# Patient Record
Sex: MALE | Race: WHITE | Employment: FULL TIME | URBAN - METROPOLITAN AREA
[De-identification: names, ages, dates, MRNs, and addresses within clinical notes are randomized per-mention and may not be internally consistent; named-entity substitution may affect disease eponyms.]

---

## 2017-05-18 ENCOUNTER — OFFICE VISIT (OUTPATIENT)
Dept: INTERNAL MEDICINE CLINIC | Age: 47
End: 2017-05-18

## 2017-05-18 VITALS
OXYGEN SATURATION: 98 % | BODY MASS INDEX: 32.78 KG/M2 | WEIGHT: 229 LBS | RESPIRATION RATE: 16 BRPM | HEART RATE: 65 BPM | TEMPERATURE: 97.6 F | SYSTOLIC BLOOD PRESSURE: 140 MMHG | DIASTOLIC BLOOD PRESSURE: 100 MMHG | HEIGHT: 70 IN

## 2017-05-18 DIAGNOSIS — I10 ESSENTIAL HYPERTENSION, BENIGN: Primary | ICD-10-CM

## 2017-05-18 RX ORDER — AMLODIPINE BESYLATE 10 MG/1
TABLET ORAL
Qty: 90 TAB | Refills: 3 | Status: SHIPPED | OUTPATIENT
Start: 2017-05-18 | End: 2017-11-14 | Stop reason: SDUPTHER

## 2017-05-18 RX ORDER — LISINOPRIL AND HYDROCHLOROTHIAZIDE 12.5; 2 MG/1; MG/1
TABLET ORAL
Qty: 90 TAB | Refills: 1 | Status: SHIPPED | OUTPATIENT
Start: 2017-05-18 | End: 2017-12-01

## 2017-05-18 RX ORDER — AMLODIPINE BESYLATE 10 MG/1
TABLET ORAL DAILY
COMMUNITY
End: 2017-05-18 | Stop reason: SDUPTHER

## 2017-05-18 RX ORDER — LISINOPRIL 20 MG/1
TABLET ORAL DAILY
COMMUNITY
End: 2017-05-18

## 2017-05-18 NOTE — PATIENT INSTRUCTIONS

## 2017-05-18 NOTE — PROGRESS NOTES
HPI:   NP evaluation  Relocated from [place]  Hx notable for HTN (BP labile on self measurement)  Recent retinal hemorrhage (R); under care of opthal  w/o chest pain/abd. discomfort; no dyspnea, cough or pedal edema; denies constitutional complaints of fever, night sweats or wt loss; no evidence of GI/ hemorrhage; no polyuria/polydipsia. Activity is age appropriate. ROS is otherwise negative. Past Medical History:   Diagnosis Date    Essential hypertension, benign     Retinal hemorrhage of right eye 04/2017       No past surgical history on file. Social History     Social History    Marital status: SINGLE     Spouse name: N/A    Number of children: N/A    Years of education: N/A     Occupational History    Not on file. Social History Main Topics    Smoking status: Never Smoker    Smokeless tobacco: Not on file    Alcohol use Yes      Comment: occ    Drug use: No    Sexual activity: Not on file     Other Topics Concern    Not on file     Social History Narrative    No narrative on file       No Known Allergies    Family History   Problem Relation Age of Onset    Hypertension Mother     Hypertension Father        Meds: norvasc 10 mg every day; lisinopril 20 mg qd        Visit Vitals    BP (!) 140/100 (BP 1 Location: Left arm, BP Patient Position: Sitting)    Pulse 65    Temp 97.6 °F (36.4 °C) (Tympanic)    Resp 16    Ht 5' 10\" (1.778 m)    Wt 229 lb (103.9 kg)    SpO2 98%    BMI 32.86 kg/m2       PE  Well nourished in NAD  HEENT:   OP: clear. Neck: supple w/o mass or bruits. Chest: clear. CV: RRR w/o m,r,g; pulses intact. Abd: soft, NT, w/o HSM or mass. Ext: w/o edema. Neuro: NF. Assessment and Plan    Encounter Diagnoses   Name Primary?  Essential hypertension, benign Yes   BP mildly elevated and labile at home  Change lisinopril to lisinopril hct 20/12.5 qd  I have reviewed/discussed the above normal BMI with the patient.   I have recommended the following interventions: dietary management education, guidance, and counseling .  .    OV 6 mos or prn (labs at that time - apparently nl 11/2016)  I have explained plan to patient and the patient verbalizes understanding

## 2017-08-30 ENCOUNTER — OFFICE VISIT (OUTPATIENT)
Dept: INTERNAL MEDICINE CLINIC | Age: 47
End: 2017-08-30

## 2017-08-30 VITALS
RESPIRATION RATE: 16 BRPM | OXYGEN SATURATION: 99 % | HEIGHT: 70 IN | WEIGHT: 235 LBS | TEMPERATURE: 97.2 F | BODY MASS INDEX: 33.64 KG/M2 | DIASTOLIC BLOOD PRESSURE: 80 MMHG | HEART RATE: 72 BPM | SYSTOLIC BLOOD PRESSURE: 138 MMHG

## 2017-08-30 DIAGNOSIS — M25.551 PAIN OF RIGHT HIP JOINT: Primary | ICD-10-CM

## 2017-08-30 DIAGNOSIS — Z23 ENCOUNTER FOR IMMUNIZATION: ICD-10-CM

## 2017-08-30 DIAGNOSIS — I10 ESSENTIAL HYPERTENSION, BENIGN: ICD-10-CM

## 2017-08-30 NOTE — PROGRESS NOTES
HPI:   Intermittent (R) posterior hip pain x 3-4 weeks; aggravated by his wallet  No injury or rash  Seen at [facility] yesterday post work injury of (L) third finger (given keflex/indocin/tramadol); has f/u arranged through work  Hx notable for HTN    ROS is otherwise negative. Past Medical History:   Diagnosis Date    Essential hypertension, benign     Retinal hemorrhage of right eye 04/2017       History reviewed. No pertinent surgical history. Social History     Social History    Marital status: SINGLE     Spouse name: N/A    Number of children: N/A    Years of education: N/A     Occupational History    Not on file. Social History Main Topics    Smoking status: Never Smoker    Smokeless tobacco: Never Used    Alcohol use Yes      Comment: occ    Drug use: No    Sexual activity: Not on file     Other Topics Concern    Not on file     Social History Narrative       No Known Allergies    Family History   Problem Relation Age of Onset    Hypertension Mother     Hypertension Father        Current Outpatient Prescriptions   Medication Sig Dispense Refill    amLODIPine (NORVASC) 10 mg tablet 1 qd 90 Tab 3    lisinopril-hydroCHLOROthiazide (PRINZIDE, ZESTORETIC) 20-12.5 mg per tablet 1 qam 90 Tab 1           Visit Vitals    /80    Pulse 72    Temp 97.2 °F (36.2 °C) (Tympanic)    Resp 16    Ht 5' 10\" (1.778 m)    Wt 235 lb (106.6 kg)    SpO2 99%    BMI 33.72 kg/m2       PE  Well nourished in NAD  HEENT: OP: clear. Neck: supple w/o mass or bruits. Chest: clear. CV: RRR w/o m,r,g; pulses intact. Abd: soft, NT, w/o HSM or mass. Ext: w/o edema. (R) hip: FROM with mild pain; mild posterior tenderness w/o rash  Neuro: NF. Assessment and Plan    Encounter Diagnoses   Name Primary?     Pain of right hip joint Yes    Essential hypertension, benign     Encounter for immunization        MSK hip pain: aggravated by wallet; attempt to carry it elsewhere; already given indocin by [facility]; to ortho if worsens  HTN - controlled  Flu vaccine given  I have reviewed/discussed the above normal BMI with the patient. I have recommended the following interventions: dietary management education, guidance, and counseling .  .    OV 4-6 mos or prn  I have explained plan to patient and the patient verbalizes understanding

## 2017-08-30 NOTE — PROGRESS NOTES
1. Have you been to the ER, urgent care clinic since your last visit? Hospitalized since your last visit? Yes When: aug 29 Where:  medical clinic Reason for visit: finger injury     2. Have you seen or consulted any other health care providers outside of the [address] [place] [address] since your last visit? Include any pap smears or colon screening.  No

## 2017-08-30 NOTE — PATIENT INSTRUCTIONS

## 2017-11-14 ENCOUNTER — OFFICE VISIT (OUTPATIENT)
Dept: INTERNAL MEDICINE CLINIC | Age: 47
End: 2017-11-14

## 2017-11-14 VITALS
TEMPERATURE: 97.6 F | SYSTOLIC BLOOD PRESSURE: 142 MMHG | RESPIRATION RATE: 16 BRPM | HEART RATE: 89 BPM | OXYGEN SATURATION: 98 % | BODY MASS INDEX: 34.79 KG/M2 | DIASTOLIC BLOOD PRESSURE: 92 MMHG | WEIGHT: 243 LBS | HEIGHT: 70 IN

## 2017-11-14 DIAGNOSIS — I10 ESSENTIAL HYPERTENSION, BENIGN: Primary | ICD-10-CM

## 2017-11-14 RX ORDER — AMLODIPINE BESYLATE 10 MG/1
TABLET ORAL
Qty: 90 TAB | Refills: 3 | Status: SHIPPED | OUTPATIENT
Start: 2017-11-14 | End: 2018-12-05 | Stop reason: SDUPTHER

## 2017-11-14 RX ORDER — LISINOPRIL AND HYDROCHLOROTHIAZIDE 20; 25 MG/1; MG/1
1 TABLET ORAL DAILY
Qty: 90 TAB | Refills: 3 | Status: SHIPPED | OUTPATIENT
Start: 2017-11-14 | End: 2018-12-05

## 2017-11-14 NOTE — PROGRESS NOTES
HPI:   Routine f/u of HTN  w/o chest pain/abd. discomfort; no dyspnea, cough or pedal edema; denies constitutional complaints of fever, night sweats or wt loss; no evidence of GI/ hemorrhage; no polyuria/polydipsia. Activity is age appropriate. ROS is otherwise negative. Past Medical History:   Diagnosis Date    Essential hypertension, benign     Retinal hemorrhage of right eye 04/2017       History reviewed. No pertinent surgical history. Social History     Social History    Marital status: SINGLE     Spouse name: N/A    Number of children: N/A    Years of education: N/A     Occupational History    Not on file. Social History Main Topics    Smoking status: Never Smoker    Smokeless tobacco: Never Used    Alcohol use Yes      Comment: occ    Drug use: No    Sexual activity: Not on file     Other Topics Concern    Not on file     Social History Narrative       No Known Allergies    Family History   Problem Relation Age of Onset    Hypertension Mother     Hypertension Father        Current Outpatient Prescriptions   Medication Sig Dispense Refill    amLODIPine (NORVASC) 10 mg tablet 1 qd 90 Tab 3    lisinopril-hydroCHLOROthiazide (PRINZIDE, ZESTORETIC) 20-12.5 mg per tablet 1 qam 90 Tab 1           Visit Vitals    BP (!) 142/92    Pulse 89    Temp 97.6 °F (36.4 °C)    Resp 16    Ht 5' 10\" (1.778 m)    Wt 243 lb (110.2 kg)    SpO2 98%    BMI 34.87 kg/m2       PE  Well nourished in NAD  HEENT:   OP: clear. Neck: supple w/o mass or bruits. Chest: clear. CV: RRR w/o m,r,g; pulses intact. Abd: soft, NT, w/o HSM or mass. Ext: w/o edema. Neuro: NF. Assessment and Plan    Encounter Diagnoses   Name Primary?  Essential hypertension, benign Yes   HTN - mildly elevated - increase lisinopril hct to 20/25 qd  Labs soon to assess metabolic parameters  I have reviewed/discussed the above normal BMI with the patient.   I have recommended the following interventions: dietary management education, guidance, and counseling . Kayy Becerril     Otherwise, no change in rx  OV 6 mos or prn  I have explained plan to patient and the patient verbalizes understanding

## 2017-11-14 NOTE — PATIENT INSTRUCTIONS

## 2017-11-14 NOTE — PROGRESS NOTES
1. Have you been to the ER, urgent care clinic since your last visit? Hospitalized since your last visit? No    2. Have you seen or consulted any other health care providers outside of the [address] [place] [address] since your last visit? Include any pap smears or colon screening.  Yes When: oct 30 [facility]: [facility] Reason for visit: eye care

## 2017-11-30 ENCOUNTER — LAB ONLY (OUTPATIENT)
Dept: INTERNAL MEDICINE CLINIC | Age: 47
End: 2017-11-30

## 2017-11-30 ENCOUNTER — HOSPITAL ENCOUNTER (OUTPATIENT)
Dept: LAB | Age: 47
Discharge: HOME | End: 2017-11-30
Payer: OTHER GOVERNMENT

## 2017-11-30 DIAGNOSIS — Z23 ENCOUNTER FOR IMMUNIZATION: ICD-10-CM

## 2017-11-30 DIAGNOSIS — I10 ESSENTIAL HYPERTENSION, BENIGN: Primary | ICD-10-CM

## 2017-11-30 DIAGNOSIS — I10 ESSENTIAL HYPERTENSION, BENIGN: ICD-10-CM

## 2017-11-30 LAB
ALBUMIN SERPL-MCNC: 4.1 G/DL (ref 3.4–5)
ALBUMIN/GLOB SERPL: 1.2 {RATIO} (ref 0.8–1.7)
ALP SERPL-CCNC: 92 U/L (ref 45–117)
ALT SERPL-CCNC: 33 U/L (ref 16–61)
ANION GAP SERPL CALC-SCNC: 7 MMOL/L (ref 3–18)
AST SERPL-CCNC: 13 U/L (ref 15–37)
BASOPHILS # BLD: 0.1 K/UL (ref 0–0.06)
BASOPHILS NFR BLD: 2 % (ref 0–2)
BILIRUB SERPL-MCNC: 0.5 MG/DL (ref 0.2–1)
BUN SERPL-MCNC: 15 MG/DL (ref 7–18)
BUN/CREAT SERPL: 15 (ref 12–20)
CALCIUM SERPL-MCNC: 9.4 MG/DL (ref 8.5–10.1)
CHLORIDE SERPL-SCNC: 105 MMOL/L (ref 100–108)
CHOLEST SERPL-MCNC: 257 MG/DL
CO2 SERPL-SCNC: 28 MMOL/L (ref 21–32)
CREAT SERPL-MCNC: 1.02 MG/DL (ref 0.6–1.3)
DIFFERENTIAL METHOD BLD: ABNORMAL
EOSINOPHIL # BLD: 0.6 K/UL (ref 0–0.4)
EOSINOPHIL NFR BLD: 10 % (ref 0–5)
ERYTHROCYTE [DISTWIDTH] IN BLOOD BY AUTOMATED COUNT: 13 % (ref 11.6–14.5)
GLOBULIN SER CALC-MCNC: 3.5 G/DL (ref 2–4)
GLUCOSE SERPL-MCNC: 162 MG/DL (ref 74–99)
HCT VFR BLD AUTO: 42.4 % (ref 36–48)
HDLC SERPL-MCNC: 45 MG/DL (ref 40–60)
HDLC SERPL: 5.7 {RATIO} (ref 0–5)
HGB BLD-MCNC: 14.3 G/DL (ref 13–16)
LDLC SERPL CALC-MCNC: 182.4 MG/DL (ref 0–100)
LIPID PROFILE,FLP: ABNORMAL
LYMPHOCYTES # BLD: 1.3 K/UL (ref 0.9–3.6)
LYMPHOCYTES NFR BLD: 22 % (ref 21–52)
MCH RBC QN AUTO: 29.1 PG (ref 24–34)
MCHC RBC AUTO-ENTMCNC: 33.7 G/DL (ref 31–37)
MCV RBC AUTO: 86.4 FL (ref 74–97)
MONOCYTES # BLD: 0.5 K/UL (ref 0.05–1.2)
MONOCYTES NFR BLD: 9 % (ref 3–10)
NEUTS SEG # BLD: 3.4 K/UL (ref 1.8–8)
NEUTS SEG NFR BLD: 57 % (ref 40–73)
PLATELET # BLD AUTO: 276 K/UL (ref 135–420)
PMV BLD AUTO: 12.3 FL (ref 9.2–11.8)
POTASSIUM SERPL-SCNC: 4.6 MMOL/L (ref 3.5–5.5)
PROT SERPL-MCNC: 7.6 G/DL (ref 6.4–8.2)
RBC # BLD AUTO: 4.91 M/UL (ref 4.7–5.5)
SODIUM SERPL-SCNC: 140 MMOL/L (ref 136–145)
TRIGL SERPL-MCNC: 148 MG/DL (ref ?–150)
VLDLC SERPL CALC-MCNC: 29.6 MG/DL
WBC # BLD AUTO: 5.9 K/UL (ref 4.6–13.2)

## 2017-11-30 PROCEDURE — 80053 COMPREHEN METABOLIC PANEL: CPT | Performed by: INTERNAL MEDICINE

## 2017-11-30 PROCEDURE — 85025 COMPLETE CBC W/AUTO DIFF WBC: CPT | Performed by: INTERNAL MEDICINE

## 2017-11-30 PROCEDURE — 80061 LIPID PANEL: CPT | Performed by: INTERNAL MEDICINE

## 2017-12-01 NOTE — PROGRESS NOTES
Repeat labs at [facility] 4 mos; urged diet in light of increased BS/chol

## 2018-04-19 ENCOUNTER — OFFICE VISIT (OUTPATIENT)
Dept: INTERNAL MEDICINE CLINIC | Age: 48
End: 2018-04-19

## 2018-04-19 VITALS
DIASTOLIC BLOOD PRESSURE: 80 MMHG | HEART RATE: 83 BPM | BODY MASS INDEX: 33.64 KG/M2 | SYSTOLIC BLOOD PRESSURE: 138 MMHG | WEIGHT: 235 LBS | RESPIRATION RATE: 16 BRPM | HEIGHT: 70 IN | OXYGEN SATURATION: 98 % | TEMPERATURE: 97.2 F

## 2018-04-19 DIAGNOSIS — I10 ESSENTIAL HYPERTENSION, BENIGN: ICD-10-CM

## 2018-04-19 DIAGNOSIS — J06.9 ACUTE URI: Primary | ICD-10-CM

## 2018-04-19 DIAGNOSIS — R73.09 ABNORMAL GLUCOSE: ICD-10-CM

## 2018-04-19 DIAGNOSIS — E78.2 MIXED HYPERLIPIDEMIA: ICD-10-CM

## 2018-04-19 NOTE — PROGRESS NOTES
1. Have you been to the ER, urgent care clinic since your last visit? Hospitalized since your last visit? No    2. Have you seen or consulted any other health care providers outside of the [facility] since your last visit? Include any pap smears or colon screening.  No

## 2018-04-19 NOTE — PROGRESS NOTES
HPI:  2 days of head congestion, facial discomfort, NP cough w/o fever, dyspnea, CP, or N  Hx notable for HTN  Recent labs showed elevated glc/chol    ROS is otherwise negative. Past Medical History:   Diagnosis Date    Essential hypertension, benign     Retinal hemorrhage of right eye 04/2017       No past surgical history on file. Social History     Social History    Marital status: SINGLE     Spouse name: N/A    Number of children: N/A    Years of education: N/A     Occupational History    Not on file. Social History Main Topics    Smoking status: Never Smoker    Smokeless tobacco: Never Used    Alcohol use Yes      Comment: occ    Drug use: No    Sexual activity: Not on file     Other Topics Concern    Not on file     Social History Narrative       No Known Allergies    Family History   Problem Relation Age of Onset    Hypertension Mother     Hypertension Father        Current Outpatient Prescriptions   Medication Sig Dispense Refill    amLODIPine (NORVASC) 10 mg tablet 1 qd 90 Tab 3    lisinopril-hydroCHLOROthiazide (PRINZIDE, ZESTORETIC) 20-25 mg per tablet Take 1 Tab by mouth daily. 90 Tab 3           Visit Vitals    /80 (BP 1 Location: Left arm, BP Patient Position: Sitting)    Pulse 83    Temp 97.2 °F (36.2 °C) (Tympanic)    Resp 16    Ht 5' 10\" (1.778 m)    Wt 235 lb (106.6 kg)    SpO2 98%    BMI 33.72 kg/m2       PE  Well nourished in NAD  HEENT:   OP: clear. TMS: clear  Neck: supple w/o mass or bruits. Chest: clear. CV: RRR w/o m,r,g; pulses intact. Abd: soft, NT, w/o HSM or mass. Ext: w/o edema. Neuro: NF. Assessment and Plan    Encounter Diagnoses   Name Primary?     Acute URI Yes    Essential hypertension, benign     Mixed hyperlipidemia     Abnormal glucose      Probable viral URI - zyrtec prn  F/U worsening or unimproved 7 days  HTN - controlled  Hyperlipidemia/elevated glucose - labs soon to assess  OV 4 mos or prn  I have explained plan to patient and the patient verbalizes understanding          Discussed the patient's BMI with him. The BMI follow up plan is as follows:     dietary management education, guidance, and counseling  encourage exercise  monitor weight  prescribed dietary intake    An After Visit Summary was printed and given to the patient.

## 2018-04-19 NOTE — LETTER
4/19/2018 10:57 AM 
 
Mr. Chani Hernandez [phone] Mr. Ruby was seen today for medical exam. 
 
 
 
 
Sincerely, 
 
 
Maxine Torres MD

## 2018-04-19 NOTE — PATIENT INSTRUCTIONS
High Cholesterol: Care Instructions  Your Care Instructions    Cholesterol is a type of fat in your blood. It is needed for many body functions, such as making new cells. Cholesterol is made by your body. It also comes from food you eat. High cholesterol means that you have too much of the fat in your blood. This raises your risk of a heart attack and stroke. LDL and HDL are part of your total cholesterol. LDL is the \"bad\" cholesterol. High LDL can raise your risk for heart disease, heart attack, and stroke. HDL is the \"good\" cholesterol. It helps clear bad cholesterol from the body. High HDL is linked with a lower risk of heart disease, heart attack, and stroke. Your cholesterol levels help your doctor find out your risk for having a heart attack or stroke. You and your doctor can talk about whether you need to lower your risk and what treatment is best for you. A heart-healthy lifestyle along with medicines can help lower your cholesterol and your risk. The way you choose to lower your risk will depend on how high your risk is for heart attack and stroke. It will also depend on how you feel about taking medicines. Follow-up care is a key part of your treatment and safety. Be sure to make and go to all appointments, and call your doctor if you are having problems. It's also a good idea to know your test results and keep a list of the medicines you take. How can you care for yourself at home? · Eat a variety of foods every day. Good choices include fruits, vegetables, whole grains (like oatmeal), dried beans and peas, nuts and seeds, soy products (like tofu), and fat-free or low-fat dairy products. · Replace butter, margarine, and hydrogenated or partially hydrogenated oils with olive and canola oils. (Canola oil margarine without trans fat is fine.)  · Replace red meat with fish, poultry, and soy protein (like tofu). · Limit processed and packaged foods like chips, crackers, and cookies.   · Bake, broil, or steam foods. Don't dill them. · Be physically active. Get at least 30 minutes of exercise on most days of the week. Walking is a good choice. You also may want to do other activities, such as running, swimming, cycling, or playing tennis or team sports. · Stay at a healthy weight or lose weight by making the changes in eating and physical activity listed above. Losing just a small amount of weight, even 5 to 10 pounds, can reduce your risk for having a heart attack or stroke. · Do not smoke. When should you call for help? Watch closely for changes in your health, and be sure to contact your doctor if:  ? · You need help making lifestyle changes. ? · You have questions about your medicine. Where can you learn more? Go to http://bettina-gale.info/. Enter Z484 in the search box to learn more about \"High Cholesterol: Care Instructions. \"  Current as of: September 21, 2016  Content Version: 11.4  © 6756-7431 [employer]. Care instructions adapted under license by [employer] (which disclaims liability or warranty for this information). If you have questions about a medical condition or this instruction, always ask your healthcare professional. [address] any warranty or liability for your use of this information. Body Mass Index: Care Instructions  Your Care Instructions    Body mass index (BMI) can help you see if your weight is raising your risk for health problems. It uses a formula to compare how much you weigh with how tall you are. · A BMI lower than 18.5 is considered underweight. · A BMI between 18.5 and 24.9 is considered healthy. · A BMI between 25 and 29.9 is considered overweight. A BMI of 30 or higher is considered obese. If your BMI is in the normal range, it means that you have a lower risk for weight-related health problems.  If your BMI is in the overweight or obese range, you may be at increased risk for weight-related health problems, such as high blood pressure, heart disease, stroke, arthritis or joint pain, and diabetes. If your BMI is in the underweight range, you may be at increased risk for health problems such as fatigue, lower protection (immunity) against illness, muscle loss, bone loss, hair loss, and hormone problems. BMI is just one measure of your risk for weight-related health problems. You may be at higher risk for health problems if you are not active, you eat an unhealthy diet, or you drink too much alcohol or use tobacco products. Follow-up care is a key part of your treatment and safety. Be sure to make and go to all appointments, and call your doctor if you are having problems. It's also a good idea to know your test results and keep a list of the medicines you take. How can you care for yourself at home? · Practice healthy eating habits. This includes eating plenty of fruits, vegetables, whole grains, lean protein, and low-fat dairy. · If your doctor recommends it, get more exercise. Walking is a good choice. Bit by bit, increase the amount you walk every day. Try for at least 30 minutes on most days of the week. · Do not smoke. Smoking can increase your risk for health problems. If you need help quitting, talk to your doctor about stop-smoking programs and medicines. These can increase your chances of quitting for good. · Limit alcohol to 2 drinks a day for men and 1 drink a day for women. Too much alcohol can cause health problems. If you have a BMI higher than 25  · Your doctor may do other tests to check your risk for weight-related health problems. This may include measuring the distance around your waist. A waist measurement of more than 40 inches in men or 35 inches in women can increase the risk of weight-related health problems. · Talk with your doctor about steps you can take to stay healthy or improve your health.  You may need to make lifestyle changes to lose weight and stay healthy, such as changing your diet and getting regular exercise. If you have a BMI lower than 18.5  · Your doctor may do other tests to check your risk for health problems. · Talk with your doctor about steps you can take to stay healthy or improve your health. You may need to make lifestyle changes to gain or maintain weight and stay healthy, such as getting more healthy foods in your diet and doing exercises to build muscle. Where can you learn more? Go to http://bettina-gale.info/. Enter S176 in the search box to learn more about \"Body Mass Index: Care Instructions. \"  Current as of: October 13, 2016  Content Version: 11.4  © 5377-9594 [employer]. Care instructions adapted under license by [employer] (which disclaims liability or warranty for this information). If you have questions about a medical condition or this instruction, always ask your healthcare professional. [address] any warranty or liability for your use of this information.

## 2018-11-29 DIAGNOSIS — I10 ESSENTIAL HYPERTENSION, BENIGN: Primary | ICD-10-CM

## 2018-11-29 DIAGNOSIS — R73.09 ABNORMAL GLUCOSE: ICD-10-CM

## 2018-12-05 ENCOUNTER — OFFICE VISIT (OUTPATIENT)
Dept: INTERNAL MEDICINE CLINIC | Age: 48
End: 2018-12-05

## 2018-12-05 VITALS
DIASTOLIC BLOOD PRESSURE: 90 MMHG | HEIGHT: 70 IN | WEIGHT: 240 LBS | OXYGEN SATURATION: 98 % | BODY MASS INDEX: 34.36 KG/M2 | RESPIRATION RATE: 16 BRPM | SYSTOLIC BLOOD PRESSURE: 150 MMHG | HEART RATE: 63 BPM | TEMPERATURE: 97.1 F

## 2018-12-05 DIAGNOSIS — R73.02 IGT (IMPAIRED GLUCOSE TOLERANCE): ICD-10-CM

## 2018-12-05 DIAGNOSIS — I10 ESSENTIAL HYPERTENSION, BENIGN: ICD-10-CM

## 2018-12-05 DIAGNOSIS — Z00.00 ROUTINE GENERAL MEDICAL EXAMINATION AT A HEALTH CARE FACILITY: Primary | ICD-10-CM

## 2018-12-05 DIAGNOSIS — E78.2 MIXED HYPERLIPIDEMIA: ICD-10-CM

## 2018-12-05 DIAGNOSIS — Z12.11 SCREEN FOR COLON CANCER: ICD-10-CM

## 2018-12-05 DIAGNOSIS — Z23 ENCOUNTER FOR IMMUNIZATION: ICD-10-CM

## 2018-12-05 RX ORDER — AMLODIPINE BESYLATE 10 MG/1
TABLET ORAL
Qty: 90 TAB | Refills: 3 | Status: SHIPPED | OUTPATIENT
Start: 2018-12-05 | End: 2020-01-09 | Stop reason: SDUPTHER

## 2018-12-05 RX ORDER — LISINOPRIL 40 MG/1
40 TABLET ORAL DAILY
Qty: 90 TAB | Refills: 3 | Status: SHIPPED | OUTPATIENT
Start: 2018-12-05 | End: 2020-01-09 | Stop reason: SDUPTHER

## 2018-12-05 RX ORDER — HYDROCHLOROTHIAZIDE 25 MG/1
25 TABLET ORAL DAILY
Qty: 90 TAB | Refills: 3 | Status: SHIPPED | OUTPATIENT
Start: 2018-12-05 | End: 2020-01-09 | Stop reason: SDUPTHER

## 2018-12-05 NOTE — PATIENT INSTRUCTIONS
Body Mass Index: Care Instructions Your Care Instructions Body mass index (BMI) can help you see if your weight is raising your risk for health problems. It uses a formula to compare how much you weigh with how tall you are. · A BMI lower than 18.5 is considered underweight. · A BMI between 18.5 and 24.9 is considered healthy. · A BMI between 25 and 29.9 is considered overweight. A BMI of 30 or higher is considered obese. If your BMI is in the normal range, it means that you have a lower risk for weight-related health problems. If your BMI is in the overweight or obese range, you may be at increased risk for weight-related health problems, such as high blood pressure, heart disease, stroke, arthritis or joint pain, and diabetes. If your BMI is in the underweight range, you may be at increased risk for health problems such as fatigue, lower protection (immunity) against illness, muscle loss, bone loss, hair loss, and hormone problems. BMI is just one measure of your risk for weight-related health problems. You may be at higher risk for health problems if you are not active, you eat an unhealthy diet, or you drink too much alcohol or use tobacco products. Follow-up care is a key part of your treatment and safety. Be sure to make and go to all appointments, and call your doctor if you are having problems. It's also a good idea to know your test results and keep a list of the medicines you take. How can you care for yourself at home? · Practice healthy eating habits. This includes eating plenty of fruits, vegetables, whole grains, lean protein, and low-fat dairy. · If your doctor recommends it, get more exercise. Walking is a good choice. Bit by bit, increase the amount you walk every day. Try for at least 30 minutes on most days of the week. · Do not smoke. Smoking can increase your risk for health problems.  If you need help quitting, talk to your doctor about stop-smoking programs and medicines. These can increase your chances of quitting for good. · Limit alcohol to 2 drinks a day for men and 1 drink a day for women. Too much alcohol can cause health problems. If you have a BMI higher than 25 · Your doctor may do other tests to check your risk for weight-related health problems. This may include measuring the distance around your waist. A waist measurement of more than 40 inches in men or 35 inches in women can increase the risk of weight-related health problems. · Talk with your doctor about steps you can take to stay healthy or improve your health. You may need to make lifestyle changes to lose weight and stay healthy, such as changing your diet and getting regular exercise. If you have a BMI lower than 18.5 · Your doctor may do other tests to check your risk for health problems. · Talk with your doctor about steps you can take to stay healthy or improve your health. You may need to make lifestyle changes to gain or maintain weight and stay healthy, such as getting more healthy foods in your diet and doing exercises to build muscle. Where can you learn more? Go to http://bettina-gale.info/. Enter S176 in the search box to learn more about \"Body Mass Index: Care Instructions. \" Current as of: October 13, 2016 Content Version: 11.4 © 2576-8912 [employer]. Care instructions adapted under license by [employer] (which disclaims liability or warranty for this information). If you have questions about a medical condition or this instruction, always ask your healthcare professional. [address] any warranty or liability for your use of this information.

## 2018-12-05 NOTE — PROGRESS NOTES
HPI:  
F/u visit for routine evaluation of HTN, IGT, hyperlipidemia Feels well 
w/o chest pain/abd. discomfort; no dyspnea, cough or pedal edema; denies constitutional complaints of fever, night sweats or wt loss; no evidence of GI/ hemorrhage; no polyuria/polydipsia. Activity is age appropriate. ROS is otherwise negative. Past Medical History:  
Diagnosis Date  Essential hypertension, benign  Retinal hemorrhage of right eye 04/2017 History reviewed. No pertinent surgical history. Social History Socioeconomic History  Marital status: SINGLE Spouse name: Not on file  Number of children: Not on file  Years of education: Not on file  Highest education level: Not on file Social Needs  Financial resource strain: Not on file  Food insecurity - worry: Not on file  Food insecurity - inability: Not on file  Transportation needs - medical: Not on file  Transportation needs - non-medical: Not on file Occupational History  Not on file Tobacco Use  Smoking status: Never Smoker  Smokeless tobacco: Never Used Substance and Sexual Activity  Alcohol use: Yes Comment: occ  Drug use: No  
 Sexual activity: Not on file Other Topics Concern  Not on file Social History Narrative  Not on file No Known Allergies Family History Problem Relation Age of Onset  Hypertension Mother  Hypertension Father Current Outpatient Medications Medication Sig Dispense Refill  amLODIPine (NORVASC) 10 mg tablet 1 qd 90 Tab 3  
 lisinopril-hydroCHLOROthiazide (PRINZIDE, ZESTORETIC) 20-25 mg per tablet Take 1 Tab by mouth daily. 90 Tab 3 Visit Vitals /90 (BP 1 Location: Left arm, BP Patient Position: Sitting) Pulse 63 Temp 97.1 °F (36.2 °C) (Tympanic) Resp 16 Ht 5' 10\" (1.778 m) Wt 240 lb (108.9 kg) SpO2 98% BMI 34.44 kg/m² PE Well nourished in NAD HEENT:  OP: clear. Neck: supple w/o mass or bruits. Chest: clear. CV: RRR w/o m,r,g; pulses intact. Abd: soft, NT, w/o HSM or mass. Rectal: heme neg; prostate 3 FBS and smooth Ext: w/o edema. Neuro: NF. Assessment and Plan Encounter Diagnoses Name Primary?  Routine general medical examination at a health care facility Yes  Essential hypertension, benign  Mixed hyperlipidemia  IGT (impaired glucose tolerance) BP elevated; increase lisinopril to 40 mg qd Labs to assess metabolic parameters (IGT/hyperlipidemia) Continue dietary/exercise efforts; colo recommended; flu vaccine advised No change in rx OV 6 mos or prn I have explained plan to patient and the patient verbalizes understanding Discussed the patient's BMI with him. The BMI follow up plan is as follows:  
 
dietary management education, guidance, and counseling 
encourage exercise 
monitor weight 
prescribed dietary intake An After Visit Summary was printed and given to the patient.

## 2018-12-05 NOTE — PROGRESS NOTES
1. Have you been to the ER, urgent care clinic since your last visit? Hospitalized since your last visit? No 
 
2. Have you seen or consulted any other health care providers [facility] of [facility]? Include any pap smears or colon screening.  No

## 2019-01-02 ENCOUNTER — HOSPITAL ENCOUNTER (OUTPATIENT)
Dept: LAB | Age: 49
Discharge: HOME | End: 2019-01-02
Payer: OTHER GOVERNMENT

## 2019-01-02 DIAGNOSIS — R73.09 ABNORMAL GLUCOSE: ICD-10-CM

## 2019-01-02 DIAGNOSIS — I10 ESSENTIAL HYPERTENSION, BENIGN: ICD-10-CM

## 2019-01-02 LAB
ALBUMIN SERPL-MCNC: 4 G/DL (ref 3.4–5)
ALBUMIN/GLOB SERPL: 1.1 {RATIO} (ref 0.8–1.7)
ALP SERPL-CCNC: 105 U/L (ref 45–117)
ALT SERPL-CCNC: 83 U/L (ref 16–61)
ANION GAP SERPL CALC-SCNC: 6 MMOL/L (ref 3–18)
AST SERPL-CCNC: 40 U/L (ref 15–37)
BASOPHILS # BLD: 0.1 K/UL (ref 0–0.1)
BASOPHILS NFR BLD: 1 % (ref 0–2)
BILIRUB SERPL-MCNC: 0.5 MG/DL (ref 0.2–1)
BUN SERPL-MCNC: 20 MG/DL (ref 7–18)
BUN/CREAT SERPL: 17 (ref 12–20)
CALCIUM SERPL-MCNC: 9 MG/DL (ref 8.5–10.1)
CHLORIDE SERPL-SCNC: 105 MMOL/L (ref 100–108)
CHOLEST SERPL-MCNC: 236 MG/DL
CO2 SERPL-SCNC: 29 MMOL/L (ref 21–32)
CREAT SERPL-MCNC: 1.16 MG/DL (ref 0.6–1.3)
DIFFERENTIAL METHOD BLD: ABNORMAL
EOSINOPHIL # BLD: 0.5 K/UL (ref 0–0.4)
EOSINOPHIL NFR BLD: 6 % (ref 0–5)
ERYTHROCYTE [DISTWIDTH] IN BLOOD BY AUTOMATED COUNT: 12.5 % (ref 11.6–14.5)
EST. AVERAGE GLUCOSE BLD GHB EST-MCNC: 154 MG/DL
GLOBULIN SER CALC-MCNC: 3.6 G/DL (ref 2–4)
GLUCOSE SERPL-MCNC: 139 MG/DL (ref 74–99)
HBA1C MFR BLD: 7 % (ref 4.2–5.6)
HCT VFR BLD AUTO: 40.9 % (ref 36–48)
HDLC SERPL-MCNC: 47 MG/DL (ref 40–60)
HDLC SERPL: 5 {RATIO} (ref 0–5)
HGB BLD-MCNC: 14 G/DL (ref 13–16)
LDLC SERPL CALC-MCNC: 164.8 MG/DL (ref 0–100)
LIPID PROFILE,FLP: ABNORMAL
LYMPHOCYTES # BLD: 1.5 K/UL (ref 0.9–3.6)
LYMPHOCYTES NFR BLD: 18 % (ref 21–52)
MCH RBC QN AUTO: 29.2 PG (ref 24–34)
MCHC RBC AUTO-ENTMCNC: 34.2 G/DL (ref 31–37)
MCV RBC AUTO: 85.4 FL (ref 74–97)
MONOCYTES # BLD: 0.7 K/UL (ref 0.05–1.2)
MONOCYTES NFR BLD: 9 % (ref 3–10)
NEUTS SEG # BLD: 5.4 K/UL (ref 1.8–8)
NEUTS SEG NFR BLD: 66 % (ref 40–73)
PLATELET # BLD AUTO: 296 K/UL (ref 135–420)
PMV BLD AUTO: 13 FL (ref 9.2–11.8)
POTASSIUM SERPL-SCNC: 4.3 MMOL/L (ref 3.5–5.5)
PROT SERPL-MCNC: 7.6 G/DL (ref 6.4–8.2)
RBC # BLD AUTO: 4.79 M/UL (ref 4.7–5.5)
SODIUM SERPL-SCNC: 140 MMOL/L (ref 136–145)
TRIGL SERPL-MCNC: 121 MG/DL (ref ?–150)
VLDLC SERPL CALC-MCNC: 24.2 MG/DL
WBC # BLD AUTO: 8.3 K/UL (ref 4.6–13.2)

## 2019-01-02 PROCEDURE — 85025 COMPLETE CBC W/AUTO DIFF WBC: CPT

## 2019-01-02 PROCEDURE — 80053 COMPREHEN METABOLIC PANEL: CPT

## 2019-01-02 PROCEDURE — 83036 HEMOGLOBIN GLYCOSYLATED A1C: CPT

## 2019-01-02 PROCEDURE — 80061 LIPID PANEL: CPT

## 2019-01-02 PROCEDURE — 36415 COLL VENOUS BLD VENIPUNCTURE: CPT

## 2019-06-18 LAB — COLONOSCOPY, EXTERNAL: NORMAL

## 2020-01-07 DIAGNOSIS — Z00.00 ROUTINE GENERAL MEDICAL EXAMINATION AT A HEALTH CARE FACILITY: Primary | ICD-10-CM

## 2020-01-07 DIAGNOSIS — Z12.5 SCREENING FOR PROSTATE CANCER: ICD-10-CM

## 2020-01-09 RX ORDER — AMLODIPINE BESYLATE 10 MG/1
TABLET ORAL
Qty: 90 TAB | Refills: 3 | Status: SHIPPED | OUTPATIENT
Start: 2020-01-09 | End: 2020-12-30

## 2020-01-09 RX ORDER — HYDROCHLOROTHIAZIDE 25 MG/1
25 TABLET ORAL DAILY
Qty: 90 TAB | Refills: 3 | Status: SHIPPED | OUTPATIENT
Start: 2020-01-09 | End: 2020-12-30

## 2020-01-09 RX ORDER — LISINOPRIL 40 MG/1
40 TABLET ORAL DAILY
Qty: 90 TAB | Refills: 3 | Status: SHIPPED | OUTPATIENT
Start: 2020-01-09 | End: 2020-12-30

## 2020-01-09 NOTE — PROGRESS NOTES
HPI:   F/u visit for routine evaluation of HTN, T2DM (diet controlled), hyperlipidemia  A1C/chol 7/236 Jan 2019; recent A1C 6.9 with chol of 192  No acute issues  w/o chest pain/abd. discomfort; no dyspnea, cough or pedal edema; denies constitutional complaints of fever, night sweats or wt loss; no evidence of GI/ hemorrhage; no polyuria/polydipsia. Activity is age appropriate. ROS is otherwise negative.     Past Medical History:   Diagnosis Date    DM type 2 (diabetes mellitus, type 2) ([facility] [address]     Essential hypertension, benign     Hyperlipidemia     Retinal hemorrhage of right eye 04/2017       Past Surgical History:   Procedure Laterality Date    HX COLONOSCOPY  06/2019    polyp       Social History     Socioeconomic History    Marital status: SINGLE     Spouse name: Not on file    Number of children: Not on file    Years of education: Not on file    Highest education level: Not on file   Occupational History    Not on file   Social Needs    Financial resource strain: Not on file    Food insecurity:     Worry: Not on file     Inability: Not on file    Transportation needs:     Medical: Not on file     Non-medical: Not on file   Tobacco Use    Smoking status: Never Smoker    Smokeless tobacco: Never Used   Substance and Sexual Activity    Alcohol use: Yes     Comment: occ    Drug use: No    Sexual activity: Not on file   Lifestyle    Physical activity:     Days per week: Not on file     Minutes per session: Not on file    Stress: Not on file   Relationships    Social connections:     Talks on phone: Not on file     Gets together: Not on file     Attends Christianity service: Not on file     Active member of club or organization: Not on file     Attends meetings of clubs or organizations: Not on file     Relationship status: Not on file    Intimate partner violence:     Fear of current or ex partner: Not on file     Emotionally abused: Not on file     Physically abused: Not on file     Forced sexual activity: Not on file   Other Topics Concern    Not on file   Social History Narrative    Not on file       No Known Allergies    Family History   Problem Relation Age of Onset    Hypertension Mother     Hypertension Father        Current Outpatient Medications   Medication Sig Dispense Refill    amLODIPine (NORVASC) 10 mg tablet 1 qd 90 Tab 3    lisinopril (PRINIVIL, ZESTRIL) 40 mg tablet Take 1 Tab by mouth daily. 90 Tab 3    hydroCHLOROthiazide (HYDRODIURIL) 25 mg tablet Take 1 Tab by mouth daily. 90 Tab 3           Visit Vitals  /80   Pulse 70   Temp 98 °F (36.7 °C)   Resp 15   Ht 5' 10\" (1.778 m)   Wt 244 lb (110.7 kg)   BMI 35.01 kg/m²       PE  obese in NAD  HEENT:  OP: clear. Neck: supple w/o mass or bruits. Chest: clear. CV: RRR w/o m,r,g; pulses intact. Abd: soft, NT, w/o HSM or mass. Rectal: heme neg; prostate 3 FBS and smooth  Ext: w/o edema. Neuro: NF. Assessment and Plan    Encounter Diagnoses   Name Primary?  Routine general medical examination at a health care facility Yes    Essential hypertension, benign     Mixed hyperlipidemia     Type 2 diabetes mellitus without complication, without long-term current use of insulin (HCC)        HTN - controlled  T2DM/hyperlipidemia - continue dietary/exercise efforts  [place] 6/2019 (polyp)  No change in rx  OV 6 mos or prn  I have explained plan to patient and the patient verbalizes understanding      Discussed the patient's BMI with him. The BMI follow up plan is as follows:     dietary management education, guidance, and counseling  encourage exercise  monitor weight  prescribed dietary intake    An After Visit Summary was printed and given to the patient.

## 2020-01-11 ENCOUNTER — HOSPITAL ENCOUNTER (OUTPATIENT)
Dept: LAB | Age: 50
Discharge: HOME | End: 2020-01-11
Payer: OTHER GOVERNMENT

## 2020-01-11 DIAGNOSIS — Z00.00 ROUTINE GENERAL MEDICAL EXAMINATION AT A HEALTH CARE FACILITY: ICD-10-CM

## 2020-01-11 DIAGNOSIS — Z12.5 SCREENING FOR PROSTATE CANCER: ICD-10-CM

## 2020-01-11 LAB
ALBUMIN SERPL-MCNC: 3.6 G/DL (ref 3.4–5)
ALBUMIN/GLOB SERPL: 0.9 {RATIO} (ref 0.8–1.7)
ALP SERPL-CCNC: 109 U/L (ref 45–117)
ALT SERPL-CCNC: 34 U/L (ref 16–61)
ANION GAP SERPL CALC-SCNC: 4 MMOL/L (ref 3–18)
AST SERPL-CCNC: 14 U/L (ref 10–38)
BASOPHILS # BLD: 0.1 K/UL (ref 0–0.1)
BASOPHILS NFR BLD: 1 % (ref 0–2)
BILIRUB SERPL-MCNC: 0.5 MG/DL (ref 0.2–1)
BUN SERPL-MCNC: 12 MG/DL (ref 7–18)
BUN/CREAT SERPL: 12 (ref 12–20)
CALCIUM SERPL-MCNC: 8.7 MG/DL (ref 8.5–10.1)
CHLORIDE SERPL-SCNC: 107 MMOL/L (ref 100–111)
CHOLEST SERPL-MCNC: 192 MG/DL
CO2 SERPL-SCNC: 30 MMOL/L (ref 21–32)
CREAT SERPL-MCNC: 1.03 MG/DL (ref 0.6–1.3)
DIFFERENTIAL METHOD BLD: ABNORMAL
EOSINOPHIL # BLD: 0.5 K/UL (ref 0–0.4)
EOSINOPHIL NFR BLD: 10 % (ref 0–5)
ERYTHROCYTE [DISTWIDTH] IN BLOOD BY AUTOMATED COUNT: 12.6 % (ref 11.6–14.5)
EST. AVERAGE GLUCOSE BLD GHB EST-MCNC: 151 MG/DL
GLOBULIN SER CALC-MCNC: 3.8 G/DL (ref 2–4)
GLUCOSE SERPL-MCNC: 160 MG/DL (ref 74–99)
HBA1C MFR BLD: 6.9 % (ref 4.2–5.6)
HCT VFR BLD AUTO: 39.7 % (ref 36–48)
HDLC SERPL-MCNC: 30 MG/DL (ref 40–60)
HDLC SERPL: 6.4 {RATIO} (ref 0–5)
HGB BLD-MCNC: 13.6 G/DL (ref 13–16)
LDLC SERPL CALC-MCNC: 131.6 MG/DL (ref 0–100)
LIPID PROFILE,FLP: ABNORMAL
LYMPHOCYTES # BLD: 1.2 K/UL (ref 0.9–3.6)
LYMPHOCYTES NFR BLD: 23 % (ref 21–52)
MCH RBC QN AUTO: 28.6 PG (ref 24–34)
MCHC RBC AUTO-ENTMCNC: 34.3 G/DL (ref 31–37)
MCV RBC AUTO: 83.4 FL (ref 74–97)
MONOCYTES # BLD: 0.7 K/UL (ref 0.05–1.2)
MONOCYTES NFR BLD: 13 % (ref 3–10)
NEUTS SEG # BLD: 2.7 K/UL (ref 1.8–8)
NEUTS SEG NFR BLD: 53 % (ref 40–73)
PLATELET # BLD AUTO: 248 K/UL (ref 135–420)
PMV BLD AUTO: 11.9 FL (ref 9.2–11.8)
POTASSIUM SERPL-SCNC: 3.8 MMOL/L (ref 3.5–5.5)
PROT SERPL-MCNC: 7.4 G/DL (ref 6.4–8.2)
PSA SERPL-MCNC: 1 NG/ML (ref 0–4)
RBC # BLD AUTO: 4.76 M/UL (ref 4.7–5.5)
SODIUM SERPL-SCNC: 141 MMOL/L (ref 136–145)
TRIGL SERPL-MCNC: 152 MG/DL (ref ?–150)
TSH SERPL DL<=0.05 MIU/L-ACNC: 2.04 UIU/ML (ref 0.36–3.74)
VLDLC SERPL CALC-MCNC: 30.4 MG/DL
WBC # BLD AUTO: 5.2 K/UL (ref 4.6–13.2)

## 2020-01-11 PROCEDURE — 36415 COLL VENOUS BLD VENIPUNCTURE: CPT

## 2020-01-11 PROCEDURE — 80061 LIPID PANEL: CPT

## 2020-01-11 PROCEDURE — 85025 COMPLETE CBC W/AUTO DIFF WBC: CPT

## 2020-01-11 PROCEDURE — 83036 HEMOGLOBIN GLYCOSYLATED A1C: CPT

## 2020-01-11 PROCEDURE — 84153 ASSAY OF PSA TOTAL: CPT

## 2020-01-11 PROCEDURE — 84443 ASSAY THYROID STIM HORMONE: CPT

## 2020-01-11 PROCEDURE — 80053 COMPREHEN METABOLIC PANEL: CPT

## 2020-01-14 ENCOUNTER — OFFICE VISIT (OUTPATIENT)
Dept: FAMILY MEDICINE CLINIC | Age: 50
End: 2020-01-14

## 2020-01-14 VITALS
TEMPERATURE: 98 F | HEIGHT: 70 IN | SYSTOLIC BLOOD PRESSURE: 130 MMHG | HEART RATE: 70 BPM | WEIGHT: 244 LBS | RESPIRATION RATE: 15 BRPM | DIASTOLIC BLOOD PRESSURE: 80 MMHG | BODY MASS INDEX: 34.93 KG/M2

## 2020-01-14 DIAGNOSIS — E78.2 MIXED HYPERLIPIDEMIA: ICD-10-CM

## 2020-01-14 DIAGNOSIS — I10 ESSENTIAL HYPERTENSION, BENIGN: ICD-10-CM

## 2020-01-14 DIAGNOSIS — Z00.00 ROUTINE GENERAL MEDICAL EXAMINATION AT A HEALTH CARE FACILITY: Primary | ICD-10-CM

## 2020-01-14 DIAGNOSIS — E11.9 TYPE 2 DIABETES MELLITUS WITHOUT COMPLICATION, WITHOUT LONG-TERM CURRENT USE OF INSULIN (HCC): ICD-10-CM

## 2020-01-14 NOTE — PATIENT INSTRUCTIONS
Learning About Meal Planning for Diabetes  Why plan your meals? Meal planning can be a key part of managing diabetes. Planning meals and snacks with the right balance of carbohydrate, protein, and fat can help you keep your blood sugar at the target level you set with your doctor. You don't have to eat special foods. You can eat what your family eats, including sweets once in a while. But you do have to pay attention to how often you eat and how much you eat of certain foods. You may want to work with a dietitian or a certified diabetes educator. He or she can give you tips and meal ideas and can answer your questions about meal planning. This health professional can also help you reach a healthy weight if that is one of your goals. What plan is right for you? Your dietitian or diabetes educator may suggest that you start with the plate format or carbohydrate counting. The plate format  The plate format is a simple way to help you manage how you eat. You plan meals by learning how much space each food should take on a plate. Using the plate format helps you spread carbohydrate throughout the day. It can make it easier to keep your blood sugar level within your target range. It also helps you see if you're eating healthy portion sizes. To use the plate format, you put non-starchy vegetables on half your plate. Add meat or meat substitutes on one-quarter of the plate. Put a grain or starchy vegetable (such as brown rice or a potato) on the final quarter of the plate. You can add a small piece of fruit and some low-fat or fat-free milk or yogurt, depending on your carbohydrate goal for each meal.  Here are some tips for using the plate format:  · Make sure that you are not using an oversized plate. A 9-inch plate is best. Many restaurants use larger plates. · Get used to using the plate format at home. Then you can use it when you eat out. · Write down your questions about using the plate format.  Talk to your doctor, a dietitian, or a diabetes educator about your concerns. Carbohydrate counting  With carbohydrate counting, you plan meals based on the amount of carbohydrate in each food. Carbohydrate raises blood sugar higher and more quickly than any other nutrient. It is found in desserts, breads and cereals, and fruit. It's also found in starchy vegetables such as potatoes and corn, grains such as rice and pasta, and milk and yogurt. Spreading carbohydrate throughout the day helps keep your blood sugar levels within your target range. Your daily amount depends on several things, including your weight, how active you are, which diabetes medicines you take, and what your goals are for your blood sugar levels. A registered dietitian or diabetes educator can help you plan how much carbohydrate to include in each meal and snack. A guideline for your daily amount of carbohydrate is:  · 45 to 60 grams at each meal. That's about the same as 3 to 4 carbohydrate servings. · 15 to 20 grams at each snack. That's about the same as 1 carbohydrate serving. The Nutrition Facts label on packaged foods tells you how much carbohydrate is in a serving of the food. First, look at the serving size on the food label. Is that the amount you eat in a serving? All of the nutrition information on a food label is based on that serving size. So if you eat more or less than that, you'll need to adjust the other numbers. Total carbohydrate is the next thing you need to look for on the label. If you count carbohydrate servings, one serving of carbohydrate is 15 grams. For foods that don't come with labels, such as fresh fruits and vegetables, you'll need a guide that lists carbohydrate in these foods. Ask your doctor, dietitian, or diabetes educator about books or other nutrition guides you can use.   If you take insulin, you need to know how many grams of carbohydrate are in a meal. This lets you know how much rapid-acting insulin to take before you eat. If you use an insulin pump, you get a constant rate of insulin during the day. So the pump must be programmed at meals to give you extra insulin to cover the rise in blood sugar after meals. When you know how much carbohydrate you will eat, you can take the right amount of insulin. Or, if you always use the same amount of insulin, you need to make sure that you eat the same amount of carbohydrate at meals. If you need more help to understand carbohydrate counting and food labels, ask your doctor, dietitian, or diabetes educator. How do you get started with meal planning? Here are some tips to get started:  · Plan your meals a week at a time. Don't forget to include snacks too. · Use cookbooks or online recipes to plan several main meals. Plan some quick meals for busy nights. You also can double some recipes that freeze well. Then you can save half for other busy nights when you don't have time to cook. · Make sure you have the ingredients you need for your recipes. If you're running low on basic items, put these items on your shopping list too. · List foods that you use to make breakfasts, lunches, and snacks. List plenty of fruits and vegetables. · Post this list on the refrigerator. Add to it as you think of more things you need. · Take the list to the store to do your weekly shopping. Follow-up care is a key part of your treatment and safety. Be sure to make and go to all appointments, and call your doctor if you are having problems. It's also a good idea to know your test results and keep a list of the medicines you take. Where can you learn more? Go to http://bettina-gale.info/. Aura Bowie in the search box to learn more about \"Learning About Meal Planning for Diabetes. \"  Current as of: April 16, 2019  Content Version: 12.2  © 7173-2328 [employer].  Care instructions adapted under license by [employer] (which disclaims liability or warranty for this information). If you have questions about a medical condition or this instruction, always ask your healthcare professional. [address] any warranty or liability for your use of this information. Body Mass Index: Care Instructions  Your Care Instructions    Body mass index (BMI) can help you see if your weight is raising your risk for health problems. It uses a formula to compare how much you weigh with how tall you are. · A BMI lower than 18.5 is considered underweight. · A BMI between 18.5 and 24.9 is considered healthy. · A BMI between 25 and 29.9 is considered overweight. A BMI of 30 or higher is considered obese. If your BMI is in the normal range, it means that you have a lower risk for weight-related health problems. If your BMI is in the overweight or obese range, you may be at increased risk for weight-related health problems, such as high blood pressure, heart disease, stroke, arthritis or joint pain, and diabetes. If your BMI is in the underweight range, you may be at increased risk for health problems such as fatigue, lower protection (immunity) against illness, muscle loss, bone loss, hair loss, and hormone problems. BMI is just one measure of your risk for weight-related health problems. You may be at higher risk for health problems if you are not active, you eat an unhealthy diet, or you drink too much alcohol or use tobacco products. Follow-up care is a key part of your treatment and safety. Be sure to make and go to all appointments, and call your doctor if you are having problems. It's also a good idea to know your test results and keep a list of the medicines you take. How can you care for yourself at home? · Practice healthy eating habits. This includes eating plenty of fruits, vegetables, whole grains, lean protein, and low-fat dairy. · If your doctor recommends it, get more exercise. Walking is a good choice.  Bit by bit, increase the amount you walk every day. Try for at least 30 minutes on most days of the week. · Do not smoke. Smoking can increase your risk for health problems. If you need help quitting, talk to your doctor about stop-smoking programs and medicines. These can increase your chances of quitting for good. · Limit alcohol to 2 drinks a day for men and 1 drink a day for women. Too much alcohol can cause health problems. If you have a BMI higher than 25  · Your doctor may do other tests to check your risk for weight-related health problems. This may include measuring the distance around your waist. A waist measurement of more than 40 inches in men or 35 inches in women can increase the risk of weight-related health problems. · Talk with your doctor about steps you can take to stay healthy or improve your health. You may need to make lifestyle changes to lose weight and stay healthy, such as changing your diet and getting regular exercise. If you have a BMI lower than 18.5  · Your doctor may do other tests to check your risk for health problems. · Talk with your doctor about steps you can take to stay healthy or improve your health. You may need to make lifestyle changes to gain or maintain weight and stay healthy, such as getting more healthy foods in your diet and doing exercises to build muscle. Where can you learn more? Go to http://bettina-gale.info/. Enter S176 in the search box to learn more about \"Body Mass Index: Care Instructions. \"  Current as of: October 13, 2016  Content Version: 11.4  © 7336-8310 [employer]. Care instructions adapted under license by [employer] (which disclaims liability or warranty for this information). If you have questions about a medical condition or this instruction, always ask your healthcare professional. [place] [address] any warranty or liability for your use of this information.

## 2020-12-30 ENCOUNTER — TELEPHONE (OUTPATIENT)
Dept: FAMILY MEDICINE CLINIC | Age: 50
End: 2020-12-30

## 2021-01-04 DIAGNOSIS — Z12.5 SCREENING FOR PROSTATE CANCER: ICD-10-CM

## 2021-01-04 DIAGNOSIS — Z00.00 ROUTINE GENERAL MEDICAL EXAMINATION AT A HEALTH CARE FACILITY: Primary | ICD-10-CM

## 2021-02-18 ENCOUNTER — HOSPITAL ENCOUNTER (OUTPATIENT)
Dept: LAB | Age: 51
Discharge: HOME | End: 2021-02-18
Payer: OTHER GOVERNMENT

## 2021-02-18 ENCOUNTER — APPOINTMENT (OUTPATIENT)
Dept: FAMILY MEDICINE CLINIC | Age: 51
End: 2021-02-18

## 2021-02-18 DIAGNOSIS — Z00.00 ROUTINE GENERAL MEDICAL EXAMINATION AT A HEALTH CARE FACILITY: ICD-10-CM

## 2021-02-18 DIAGNOSIS — Z12.5 SCREENING FOR PROSTATE CANCER: ICD-10-CM

## 2021-02-18 LAB
ALBUMIN SERPL-MCNC: 3.8 G/DL (ref 3.4–5)
ALBUMIN/GLOB SERPL: 1.1 {RATIO} (ref 0.8–1.7)
ALP SERPL-CCNC: 107 U/L (ref 45–117)
ALT SERPL-CCNC: 45 U/L (ref 16–61)
ANION GAP SERPL CALC-SCNC: 5 MMOL/L (ref 3–18)
AST SERPL-CCNC: 20 U/L (ref 10–38)
BASOPHILS # BLD: 0.1 K/UL (ref 0–0.1)
BASOPHILS NFR BLD: 1 % (ref 0–2)
BILIRUB SERPL-MCNC: 0.6 MG/DL (ref 0.2–1)
BUN SERPL-MCNC: 13 MG/DL (ref 7–18)
BUN/CREAT SERPL: 16 (ref 12–20)
CALCIUM SERPL-MCNC: 8.7 MG/DL (ref 8.5–10.1)
CHLORIDE SERPL-SCNC: 104 MMOL/L (ref 100–111)
CHOLEST SERPL-MCNC: 209 MG/DL
CO2 SERPL-SCNC: 29 MMOL/L (ref 21–32)
CREAT SERPL-MCNC: 0.83 MG/DL (ref 0.6–1.3)
DIFFERENTIAL METHOD BLD: ABNORMAL
EOSINOPHIL # BLD: 0.4 K/UL (ref 0–0.4)
EOSINOPHIL NFR BLD: 5 % (ref 0–5)
ERYTHROCYTE [DISTWIDTH] IN BLOOD BY AUTOMATED COUNT: 13.3 % (ref 11.6–14.5)
EST. AVERAGE GLUCOSE BLD GHB EST-MCNC: 226 MG/DL
GLOBULIN SER CALC-MCNC: 3.6 G/DL (ref 2–4)
GLUCOSE SERPL-MCNC: 132 MG/DL (ref 74–99)
HBA1C MFR BLD: 9.5 % (ref 4.2–5.6)
HCT VFR BLD AUTO: 36.1 % (ref 36–48)
HDLC SERPL-MCNC: 39 MG/DL (ref 40–60)
HDLC SERPL: 5.4 {RATIO} (ref 0–5)
HGB BLD-MCNC: 12.5 G/DL (ref 13–16)
LDLC SERPL CALC-MCNC: 149.8 MG/DL (ref 0–100)
LIPID PROFILE,FLP: ABNORMAL
LYMPHOCYTES # BLD: 1.4 K/UL (ref 0.9–3.6)
LYMPHOCYTES NFR BLD: 18 % (ref 21–52)
MCH RBC QN AUTO: 28.8 PG (ref 24–34)
MCHC RBC AUTO-ENTMCNC: 34.6 G/DL (ref 31–37)
MCV RBC AUTO: 83.2 FL (ref 74–97)
MONOCYTES # BLD: 0.6 K/UL (ref 0.05–1.2)
MONOCYTES NFR BLD: 8 % (ref 3–10)
NEUTS SEG # BLD: 5.2 K/UL (ref 1.8–8)
NEUTS SEG NFR BLD: 68 % (ref 40–73)
PLATELET # BLD AUTO: 252 K/UL (ref 135–420)
PMV BLD AUTO: 12.1 FL (ref 9.2–11.8)
POTASSIUM SERPL-SCNC: 4.3 MMOL/L (ref 3.5–5.5)
PROT SERPL-MCNC: 7.4 G/DL (ref 6.4–8.2)
PSA SERPL-MCNC: 1 NG/ML (ref 0–4)
RBC # BLD AUTO: 4.34 M/UL (ref 4.7–5.5)
SODIUM SERPL-SCNC: 138 MMOL/L (ref 136–145)
TRIGL SERPL-MCNC: 101 MG/DL (ref ?–150)
VLDLC SERPL CALC-MCNC: 20.2 MG/DL
WBC # BLD AUTO: 7.6 K/UL (ref 4.6–13.2)

## 2021-02-18 PROCEDURE — 36415 COLL VENOUS BLD VENIPUNCTURE: CPT

## 2021-02-18 PROCEDURE — 80053 COMPREHEN METABOLIC PANEL: CPT

## 2021-02-18 PROCEDURE — 83036 HEMOGLOBIN GLYCOSYLATED A1C: CPT

## 2021-02-18 PROCEDURE — 80061 LIPID PANEL: CPT

## 2021-02-18 PROCEDURE — 85025 COMPLETE CBC W/AUTO DIFF WBC: CPT

## 2021-02-18 PROCEDURE — 84153 ASSAY OF PSA TOTAL: CPT

## 2021-02-23 ENCOUNTER — VIRTUAL VISIT (OUTPATIENT)
Dept: FAMILY MEDICINE CLINIC | Age: 51
End: 2021-02-23
Payer: OTHER GOVERNMENT

## 2021-02-23 DIAGNOSIS — I10 ESSENTIAL HYPERTENSION, BENIGN: ICD-10-CM

## 2021-02-23 DIAGNOSIS — E11.9 TYPE 2 DIABETES MELLITUS WITHOUT COMPLICATION, WITHOUT LONG-TERM CURRENT USE OF INSULIN (HCC): Primary | ICD-10-CM

## 2021-02-23 DIAGNOSIS — E78.2 MIXED HYPERLIPIDEMIA: ICD-10-CM

## 2021-02-23 PROCEDURE — 99213 OFFICE O/P EST LOW 20 MIN: CPT | Performed by: INTERNAL MEDICINE

## 2021-02-23 RX ORDER — LISINOPRIL 40 MG/1
TABLET ORAL
Qty: 90 TAB | Refills: 3 | Status: SHIPPED | OUTPATIENT
Start: 2021-02-23 | End: 2021-09-01 | Stop reason: SDUPTHER

## 2021-02-23 RX ORDER — AMLODIPINE BESYLATE 10 MG/1
TABLET ORAL
Qty: 90 TAB | Refills: 3 | Status: SHIPPED | OUTPATIENT
Start: 2021-02-23 | End: 2021-09-01 | Stop reason: SDUPTHER

## 2021-02-23 RX ORDER — METFORMIN HYDROCHLORIDE 500 MG/1
500 TABLET ORAL 2 TIMES DAILY WITH MEALS
Qty: 180 TAB | Refills: 1 | Status: SHIPPED | OUTPATIENT
Start: 2021-02-23 | End: 2021-08-27 | Stop reason: SINTOL

## 2021-02-23 RX ORDER — HYDROCHLOROTHIAZIDE 25 MG/1
TABLET ORAL
Qty: 90 TAB | Refills: 3 | Status: SHIPPED | OUTPATIENT
Start: 2021-02-23 | End: 2021-09-01 | Stop reason: SDUPTHER

## 2021-02-23 NOTE — PROGRESS NOTES
Francois Lewis is a 44 y.o. male who was seen by synchronous (real-time) audio-video technology on 2/23/2021 for Hypertension        Assessment & Plan:   Diagnoses and all orders for this visit:    1. Type 2 diabetes mellitus without complication, without long-term current use of insulin ([facility] [address]    2. Essential hypertension, benign    3. Mixed hyperlipidemia      HTN/T2DM/hyperlipidemia - continue dietary/exercise efforts  Add metformin 500 mg bid with food   Otherwise, no change in rx  OV 4 mos or prn  I have explained plan to patient and the patient verbalizes understanding    I spent at least 20 minutes on this visit with this established patient. 712  Subjective:   F/u visit for routine evaluation of HTN, diet controlled T2DM, hyperlipidemia  Recent A1C 9.5 (up from 6.4 one year ago) with chol of 209  No acute issues  Recovered from COVID 19 dx'd 1/21/21  Doing well w/o chest/abdominal pain, dyspnea, cough, fever, evidence of GI/ hemorrhage, constitutional complaints; physically active      Prior to Admission medications    Medication Sig Start Date End Date Taking?  Authorizing Provider   amLODIPine (NORVASC) 10 mg tablet TAKE ONE TABLET BY MOUTH DAILY 12/30/20   Brenna Ballesteros MD   lisinopriL (PRINIVIL, ZESTRIL) 40 mg tablet TAKE ONE TABLET BY MOUTH DAILY 12/30/20   Brenna Ballesteros MD   hydroCHLOROthiazide (HYDRODIURIL) 25 mg tablet TAKE ONE TABLET BY MOUTH DAILY 12/30/20   Brenna Ballesteros MD     Current Outpatient Medications   Medication Sig Dispense Refill    amLODIPine (NORVASC) 10 mg tablet TAKE ONE TABLET BY MOUTH DAILY 90 Tab 0    lisinopriL (PRINIVIL, ZESTRIL) 40 mg tablet TAKE ONE TABLET BY MOUTH DAILY 90 Tab 0    hydroCHLOROthiazide (HYDRODIURIL) 25 mg tablet TAKE ONE TABLET BY MOUTH DAILY 90 Tab 0     No Known Allergies  Past Medical History:   Diagnosis Date    DM type 2 (diabetes mellitus, type 2) (HCC)     Essential hypertension, benign     Hyperlipidemia     Retinal hemorrhage of right eye 04/2017     Past Surgical History:   Procedure Laterality Date    HX COLONOSCOPY  06/2019    hyperplastic polyp       ROS per HPI    Objective:   No flowsheet data found. General: alert, cooperative, no distress   Mental  status: normal mood, behavior, speech, dress, motor activity, and thought processes, able to follow commands   HENT: NCAT   Neck: no visualized mass   Resp: no respiratory distress   Neuro: no gross deficits   Skin: no discoloration or lesions of concern on visible areas   Psychiatric: normal affect, consistent with stated mood, no evidence of hallucinations     Additional exam findings: no      We discussed the expected course, resolution and complications of the diagnosis(es) in detail. Medication risks, benefits, costs, interactions, and alternatives were discussed as indicated. I advised him to contact the office if his condition worsens, changes or fails to improve as anticipated. He expressed understanding with the diagnosis(es) and plan. Meir Gallagher Sr., who was evaluated through a patient-initiated, synchronous (real-time) audio-video encounter, and/or his healthcare decision maker, is aware that it is a billable service, with coverage as determined by his insurance carrier. He provided verbal consent to proceed: Yes, and patient identification was verified. It was conducted pursuant to the emergency declaration under the [facility], [address] [place] [address] authority and the [employer] Act. A caregiver was present when appropriate. Ability to conduct physical exam was limited. I was at home. The patient was at home.       Chris Gordon MD

## 2021-04-05 ENCOUNTER — TELEPHONE (OUTPATIENT)
Dept: FAMILY MEDICINE CLINIC | Age: 51
End: 2021-04-05

## 2021-04-05 NOTE — TELEPHONE ENCOUNTER
Called and left message that Dr Yevgeniy Martínez is retiring last day May 13, please call office to rescheduled.

## 2021-06-02 ENCOUNTER — TELEPHONE (OUTPATIENT)
Dept: FAMILY MEDICINE CLINIC | Age: 51
End: 2021-06-02

## 2021-06-02 NOTE — TELEPHONE ENCOUNTER
Called patient left message that Dr Sunny Doss is not here anymore, and we will need to remove you from the schedule for 6/3/21

## 2021-07-30 ENCOUNTER — OFFICE VISIT (OUTPATIENT)
Dept: FAMILY MEDICINE CLINIC | Age: 51
End: 2021-07-30
Payer: OTHER GOVERNMENT

## 2021-07-30 PROCEDURE — 90471 IMMUNIZATION ADMIN: CPT | Performed by: NURSE PRACTITIONER

## 2021-08-27 ENCOUNTER — HOSPITAL ENCOUNTER (OUTPATIENT)
Dept: LAB | Age: 51
Discharge: HOME | End: 2021-08-27
Payer: OTHER GOVERNMENT

## 2021-08-27 ENCOUNTER — OFFICE VISIT (OUTPATIENT)
Dept: FAMILY MEDICINE CLINIC | Age: 51
End: 2021-08-27
Payer: OTHER GOVERNMENT

## 2021-08-27 VITALS
RESPIRATION RATE: 16 BRPM | BODY MASS INDEX: 34.27 KG/M2 | WEIGHT: 239.4 LBS | TEMPERATURE: 97.1 F | HEART RATE: 82 BPM | HEIGHT: 70 IN | DIASTOLIC BLOOD PRESSURE: 86 MMHG | OXYGEN SATURATION: 97 % | SYSTOLIC BLOOD PRESSURE: 140 MMHG

## 2021-08-27 DIAGNOSIS — E78.2 MIXED HYPERLIPIDEMIA: ICD-10-CM

## 2021-08-27 DIAGNOSIS — E11.9 TYPE 2 DIABETES MELLITUS WITHOUT COMPLICATION, WITHOUT LONG-TERM CURRENT USE OF INSULIN (HCC): ICD-10-CM

## 2021-08-27 DIAGNOSIS — Z23 ENCOUNTER FOR IMMUNIZATION: ICD-10-CM

## 2021-08-27 DIAGNOSIS — I10 ESSENTIAL HYPERTENSION, BENIGN: Primary | ICD-10-CM

## 2021-08-27 DIAGNOSIS — I10 ESSENTIAL HYPERTENSION, BENIGN: ICD-10-CM

## 2021-08-27 DIAGNOSIS — Z11.59 ENCOUNTER FOR HEPATITIS C SCREENING TEST FOR LOW RISK PATIENT: ICD-10-CM

## 2021-08-27 DIAGNOSIS — E11.9 COMPREHENSIVE DIABETIC FOOT EXAMINATION, TYPE 2 DM, ENCOUNTER FOR (HCC): ICD-10-CM

## 2021-08-27 LAB
ANION GAP SERPL CALC-SCNC: 4 MMOL/L (ref 3–18)
BUN SERPL-MCNC: 10 MG/DL (ref 7–18)
BUN/CREAT SERPL: 11 (ref 12–20)
CALCIUM SERPL-MCNC: 9.4 MG/DL (ref 8.5–10.1)
CHLORIDE SERPL-SCNC: 102 MMOL/L (ref 100–111)
CHOLEST SERPL-MCNC: 260 MG/DL
CO2 SERPL-SCNC: 32 MMOL/L (ref 21–32)
CREAT SERPL-MCNC: 0.88 MG/DL (ref 0.6–1.3)
CREAT UR-MCNC: 102 MG/DL (ref 30–125)
EST. AVERAGE GLUCOSE BLD GHB EST-MCNC: 235 MG/DL
GLUCOSE SERPL-MCNC: 191 MG/DL (ref 74–99)
HBA1C MFR BLD: 9.8 % (ref 4.2–5.6)
HDLC SERPL-MCNC: 45 MG/DL (ref 40–60)
HDLC SERPL: 5.8 {RATIO} (ref 0–5)
LDLC SERPL CALC-MCNC: 186.2 MG/DL (ref 0–100)
LIPID PROFILE,FLP: ABNORMAL
MICROALBUMIN UR-MCNC: 1.83 MG/DL (ref 0–3)
MICROALBUMIN/CREAT UR-RTO: 18 MG/G (ref 0–30)
POTASSIUM SERPL-SCNC: 4.1 MMOL/L (ref 3.5–5.5)
SODIUM SERPL-SCNC: 138 MMOL/L (ref 136–145)
TRIGL SERPL-MCNC: 144 MG/DL (ref ?–150)
VLDLC SERPL CALC-MCNC: 28.8 MG/DL

## 2021-08-27 PROCEDURE — 82043 UR ALBUMIN QUANTITATIVE: CPT

## 2021-08-27 PROCEDURE — 80061 LIPID PANEL: CPT

## 2021-08-27 PROCEDURE — 86803 HEPATITIS C AB TEST: CPT

## 2021-08-27 PROCEDURE — 99214 OFFICE O/P EST MOD 30 MIN: CPT | Performed by: NURSE PRACTITIONER

## 2021-08-27 PROCEDURE — 83036 HEMOGLOBIN GLYCOSYLATED A1C: CPT

## 2021-08-27 PROCEDURE — 36415 COLL VENOUS BLD VENIPUNCTURE: CPT

## 2021-08-27 PROCEDURE — 80048 BASIC METABOLIC PNL TOTAL CA: CPT

## 2021-08-27 RX ORDER — GLIPIZIDE 5 MG/1
5 TABLET ORAL 2 TIMES DAILY
Qty: 180 TABLET | Refills: 0 | Status: SHIPPED | OUTPATIENT
Start: 2021-08-27

## 2021-08-27 NOTE — PROGRESS NOTES
Office Note        Patient: Lesa Eisenberg  Subjective:     Vivien Interiano is a 44 y.o. WHITE/NON- male who was seen in clinic today (8/27/2021) for evaluation of No chief complaint on file. Diabetes control uncertain, Hypertension control uncertain, Hyperlipidemia control uncertain. Hypertension/HLD:    Diet and Lifestyle: generally follows a low sodium diet  Home BP Monitoring: is not measured at home    Cardiovascular ROS: taking medications as instructed, no medication side effects noted, no TIA's, no chest pain on exertion, no dyspnea on exertion, no swelling of ankles. Lab Results   Component Value Date/Time    Sodium 138 02/18/2021 02:54 PM    Potassium 4.3 02/18/2021 02:54 PM    Chloride 104 02/18/2021 02:54 PM    CO2 29 02/18/2021 02:54 PM    Anion gap 5 02/18/2021 02:54 PM    Glucose 132 (H) 02/18/2021 02:54 PM    BUN 13 02/18/2021 02:54 PM    Creatinine 0.83 02/18/2021 02:54 PM    BUN/Creatinine ratio 16 02/18/2021 02:54 PM    GFR est AA >60 02/18/2021 02:54 PM    GFR est non-AA >60 02/18/2021 02:54 PM    Calcium 8.7 02/18/2021 02:54 PM      Lab Results   Component Value Date/Time    WBC 7.6 02/18/2021 02:54 PM    HGB 12.5 (L) 02/18/2021 02:54 PM    HCT 36.1 02/18/2021 02:54 PM    PLATELET 304 23/81/9810 02:54 PM    MCV 83.2 02/18/2021 02:54 PM      Lab Results   Component Value Date/Time    Hemoglobin A1c 9.5 (H) 02/18/2021 02:54 PM      Lab Results   Component Value Date/Time    Cholesterol, total 209 (H) 02/18/2021 02:54 PM    HDL Cholesterol 39 (L) 02/18/2021 02:54 PM    LDL, calculated 149.8 (H) 02/18/2021 02:54 PM    VLDL, calculated 20.2 02/18/2021 02:54 PM    Triglyceride 101 02/18/2021 02:54 PM    CHOL/HDL Ratio 5.4 (H) 02/18/2021 02:54 PM        Key Antihyperlipidemia Meds     The patient is on no antihyperlipidemia meds. New concerns: N/A. Diabetes:    Since last visit, he  reports: no significant changes.     He reports medication compliance: compliant most of the time. Medication side effects: none. Diabetic diet compliance: compliant most of the time   Activity level:not active    Home glucoses: Pt is not checking his BS  Hypoglycemic episodes: N/A    Diabetic foot exam:     Left Foot:   Visual Exam: normal    Pulse DP: 2+ (normal)   Filament test: normal sensation    Vibratory sensation: normal      Right Foot:   Visual Exam: normal    Pulse DP: 2+ (normal)   Filament test: normal sensation    Vibratory sensation: normal      Diabetic Foot and Eye Exam  Status   Topic Date Due    Eye Exam  Never done    Diabetic Foot Care  08/27/2022           Objective:     Visit Vitals  BP (!) 140/86   Pulse 82   Temp 97.1 °F (36.2 °C)   Resp 16   Ht 5' 10\" (1.778 m)   Wt 239 lb 6.4 oz (108.6 kg)   SpO2 97%   BMI 34.35 kg/m²       Appearance: alert, well appearing, and in no distress, oriented to person, place, and time and overweight. Exam: heart sounds normal rate, regular rhythm, normal S1, S2, no murmurs, rubs, clicks or gallops, chest clear, no carotid bruits  Lab review: labs are reviewed, up to date. Assessment & Plan:     diabetes control uncertain, hypertension needs improvement, hyperlipidemia stable. Diabetic issues reviewed with him: home glucose monitoring emphasized, foot care discussed and Podiatry visits discussed and annual eye examinations at Ophthalmology discussed. Hypertension issues reviewed with him: Continue current therapy         ICD-10-CM ICD-9-CM    1. Essential hypertension, benign  Z33 825.3 METABOLIC PANEL, BASIC   2. Type 2 diabetes mellitus without complication, without long-term current use of insulin ([facility])  E11.9 250.00 HEMOGLOBIN A1C WITH EAG      MICROALBUMIN, UR, RAND W/ MICROALB/CREAT RATIO      SITagliptin (JANUVIA) 25 mg tablet      glipiZIDE (GLUCOTROL) 5 mg tablet   3.  Comprehensive diabetic foot examination, type 2 DM, encounter for ([facility]  E11.9 250.00  DIABETES FOOT EXAM   4. Mixed hyperlipidemia E78.2 272.2 LIPID PANEL   5. Encounter for immunization  Z23 V03.89 ZOSTER VACC RECOMBINANT ADJUVANTED   6. Encounter for hepatitis C screening test for low risk patient  Z11.59 V73.89 HEPATITIS C AB         Follow-up and Dispositions    · Return in about 3 months (around 11/27/2021) for Hypertension, High Cholesterol, Diabetes, (In Office), Labs 1 Week Prior. Disclaimer:    I have discussed the diagnosis with the patient and the intended plan as seen above. The patient understands our medical plan. The risks, benefits and significant side effects of all medications have been reviewed. Anticipated time course and progression of condition reviewed. All questions have been addressed. He received an after visit summary, with information reviewed, and questions answered. Where appropriate, he is instructed to call the clinic if he has not been notified either by phone or through [address] with the results of his tests or with an appointment plan for any referrals within 1 week(s). The patient  is to call if his condition worsens or fails to improve or if significant side effects are experienced.        Renita Kothari, NP Name band;

## 2021-08-29 LAB
HCV AB SER IA-ACNC: 0.03 INDEX
HCV AB SERPL QL IA: NEGATIVE
HCV COMMENT,HCGAC: NORMAL

## 2021-09-01 ENCOUNTER — TELEPHONE (OUTPATIENT)
Dept: FAMILY MEDICINE CLINIC | Age: 51
End: 2021-09-01

## 2021-09-01 PROCEDURE — 90750 HZV VACC RECOMBINANT IM: CPT | Performed by: NURSE PRACTITIONER

## 2021-09-01 RX ORDER — AMLODIPINE BESYLATE 10 MG/1
TABLET ORAL
Qty: 90 TABLET | Refills: 3 | Status: SHIPPED | OUTPATIENT
Start: 2021-09-01

## 2021-09-01 RX ORDER — LISINOPRIL 40 MG/1
TABLET ORAL
Qty: 90 TABLET | Refills: 3 | Status: SHIPPED | OUTPATIENT
Start: 2021-09-01

## 2021-09-01 RX ORDER — HYDROCHLOROTHIAZIDE 25 MG/1
TABLET ORAL
Qty: 90 TABLET | Refills: 3 | Status: SHIPPED | OUTPATIENT
Start: 2021-09-01

## 2021-09-01 NOTE — TELEPHONE ENCOUNTER
Per pharmacy - Januvia in a non-preferred medication. Would you like to prescribe an alternative or should a PA be attempted?     Preferred alternatives:  Amaryl  Glyburide  Glipizide  Metformin

## 2021-09-01 NOTE — TELEPHONE ENCOUNTER
Minor Robledo is requesting a 90 day supply  Previous Rx's were sent to [facility]    Last Visit: 8/27/21 with ARMANI Arauz  Next Appointment: 11/30/21 with ARMANI Arauz    Requested Prescriptions     Pending Prescriptions Disp Refills    amLODIPine (NORVASC) 10 mg tablet 90 Tablet 3     Sig: TAKE ONE TABLET BY MOUTH DAILY    hydroCHLOROthiazide (HYDRODIURIL) 25 mg tablet 90 Tablet 3     Sig: TAKE ONE TABLET BY MOUTH DAILY    lisinopriL (PRINIVIL, ZESTRIL) 40 mg tablet 90 Tablet 3     Sig: TAKE ONE TABLET BY MOUTH DAILY

## 2021-09-01 NOTE — TELEPHONE ENCOUNTER
Please contact the patient's plan to initiate a prior authorization for Januvia at [phone].     Clinical questions:

## 2021-10-05 DIAGNOSIS — E78.2 MIXED HYPERLIPIDEMIA: Primary | ICD-10-CM

## 2021-10-05 RX ORDER — ATORVASTATIN CALCIUM 20 MG/1
20 TABLET, FILM COATED ORAL DAILY
Qty: 90 TABLET | Refills: 0 | Status: SHIPPED | OUTPATIENT
Start: 2021-10-05

## 2021-10-05 NOTE — PROGRESS NOTES
Please let patient know that his cholesterol levels are elevated, and he needs to be placed on medication. This medication will be sent to his pharmacy, and he will follow-up with new fasting labs in 2 months. In addition, his A1c is 9.8%, and should be at 7 or less. He is to work on diet, exercise, and checking his blood sugar daily.

## 2021-11-30 ENCOUNTER — OFFICE VISIT (OUTPATIENT)
Dept: FAMILY MEDICINE CLINIC | Age: 51
End: 2021-11-30
Payer: OTHER GOVERNMENT

## 2021-11-30 VITALS
RESPIRATION RATE: 16 BRPM | WEIGHT: 248 LBS | OXYGEN SATURATION: 97 % | DIASTOLIC BLOOD PRESSURE: 88 MMHG | HEART RATE: 71 BPM | TEMPERATURE: 97.5 F | SYSTOLIC BLOOD PRESSURE: 156 MMHG | BODY MASS INDEX: 35.58 KG/M2

## 2021-11-30 DIAGNOSIS — I10 ESSENTIAL HYPERTENSION, BENIGN: Primary | ICD-10-CM

## 2021-11-30 DIAGNOSIS — E78.2 MIXED HYPERLIPIDEMIA: ICD-10-CM

## 2021-11-30 DIAGNOSIS — E11.9 TYPE 2 DIABETES MELLITUS WITHOUT COMPLICATION, WITHOUT LONG-TERM CURRENT USE OF INSULIN (HCC): ICD-10-CM

## 2021-11-30 PROCEDURE — 3051F HG A1C>EQUAL 7.0%<8.0%: CPT | Performed by: NURSE PRACTITIONER

## 2021-11-30 PROCEDURE — 99214 OFFICE O/P EST MOD 30 MIN: CPT | Performed by: NURSE PRACTITIONER

## 2021-11-30 NOTE — PROGRESS NOTES
Depression Screening:  3 most recent PHQ Screens 11/30/2021   Little interest or pleasure in doing things Not at all   Feeling down, depressed, irritable, or hopeless Not at all   Total Score PHQ 2 0       Learning Assessment:  Learning Assessment 8/30/2017   PRIMARY LEARNER Patient   HIGHEST LEVEL OF EDUCATION - PRIMARY LEARNER  [employer]   BARRIERS PRIMARY LEARNER NONE     NONE   PRIMARY LANGUAGE ENGLISH    NEED No   LEARNER PREFERENCE PRIMARY DEMONSTRATION     VIDEOS     LISTENING     READING   ANSWERED BY patient   RELATIONSHIP SELF       Abuse Screening:  No flowsheet data found. Fall Risk  No flowsheet data found. ADL  No flowsheet data found. Travel Screening:    Travel Screening     Question   Response    In the last month, have you been in contact with someone who was confirmed or suspected to have Coronavirus / COVID-19? No / Unsure    Have you had a COVID-19 viral test in the last 14 days? Yes - Negative result    Do you have any of the following new or worsening symptoms? None of these    Have you traveled internationally or domestically in the last month? Yes ([address] 11/12-11/24)      Travel History   Travel since 10/30/21    No documented travel since 10/30/21         Health Maintenance reviewed and discussed and ordered per Provider. Health Maintenance Due   Topic Date Due    COVID-19 Vaccine (1) Never done    Eye Exam Retinal or Dilated  Never done    DTaP/Tdap/Td series (1 - Tdap) Never done    Flu Vaccine (1) 09/01/2021    Shingrix Vaccine Age 50> (2 of 2) 10/27/2021    A1C test (Diabetic or Prediabetic)  11/27/2021   . Harper Rios presents today for   Chief Complaint   Patient presents with    Follow-up       Is someone accompanying this pt? no    Is the patient using any DME equipment during OV? no    Coordination of Care:  1. Have you been to the ER, urgent care clinic since your last visit? Hospitalized since your last visit? no    2.  Have you seen or consulted any other health care providers outside of the [address] [place] [address] since your last visit? Include any pap smears or colon screening.  no

## 2021-11-30 NOTE — PROGRESS NOTES
Subjective:    Kylah Ramon is a 46y.o. year old male seen for follow up of diabetes. He also has hypertension and hyperlipidemia. Diabetic Review of Systems - medication compliance: compliant all of the time, diabetic diet compliance: noncompliant some of the time, home glucose monitoring: is not performed, further diabetic ROS: no polyuria or polydipsia, no chest pain, dyspnea or TIA's, no numbness, tingling or pain in extremities, last eye exam approximately 6 weeks ago with [facility]. Other symptoms and concerns: none. Patient Active Problem List   Diagnosis Code    Essential hypertension, benign I10    Mixed hyperlipidemia E78.2    Type 2 diabetes mellitus without complication, without long-term current use of insulin ([facility]) E11.9    Hyperlipidemia E78.5     Current Outpatient Medications   Medication Sig Dispense Refill    atorvastatin (LIPITOR) 20 mg tablet Take 1 Tablet by mouth daily. 90 Tablet 0    amLODIPine (NORVASC) 10 mg tablet TAKE ONE TABLET BY MOUTH DAILY 90 Tablet 3    hydroCHLOROthiazide (HYDRODIURIL) 25 mg tablet TAKE ONE TABLET BY MOUTH DAILY 90 Tablet 3    lisinopriL (PRINIVIL, ZESTRIL) 40 mg tablet TAKE ONE TABLET BY MOUTH DAILY 90 Tablet 3    SITagliptin (JANUVIA) 25 mg tablet Take 1 Tablet by mouth daily. 90 Tablet 0    glipiZIDE (GLUCOTROL) 5 mg tablet Take 1 Tablet by mouth two (2) times a day.  180 Tablet 0      No Known Allergies  Past Surgical History:   Procedure Laterality Date    HX COLONOSCOPY  06/2019    hyperplastic polyp     Family History   Problem Relation Age of Onset    Hypertension Mother     Hypertension Father       Lab Results   Component Value Date/Time    Cholesterol, total 260 (H) 08/27/2021 12:25 PM    HDL Cholesterol 45 08/27/2021 12:25 PM    LDL, calculated 186.2 (H) 08/27/2021 12:25 PM    VLDL, calculated 28.8 08/27/2021 12:25 PM    Triglyceride 144 08/27/2021 12:25 PM    CHOL/HDL Ratio 5.8 (H) 08/27/2021 12:25 PM     Lab Results Component Value Date/Time    Sodium 138 08/27/2021 12:25 PM    Potassium 4.1 08/27/2021 12:25 PM    Chloride 102 08/27/2021 12:25 PM    CO2 32 08/27/2021 12:25 PM    Anion gap 4 08/27/2021 12:25 PM    Glucose 191 (H) 08/27/2021 12:25 PM    BUN 10 08/27/2021 12:25 PM    Creatinine 0.88 08/27/2021 12:25 PM    BUN/Creatinine ratio 11 (L) 08/27/2021 12:25 PM    GFR est AA >60 08/27/2021 12:25 PM    GFR est non-AA >60 08/27/2021 12:25 PM    Calcium 9.4 08/27/2021 12:25 PM    Bilirubin, total 0.6 02/18/2021 02:54 PM    Alk.  phosphatase 107 02/18/2021 02:54 PM    Protein, total 7.4 02/18/2021 02:54 PM    Albumin 3.8 02/18/2021 02:54 PM    Globulin 3.6 02/18/2021 02:54 PM    A-G Ratio 1.1 02/18/2021 02:54 PM    ALT (SGPT) 45 02/18/2021 02:54 PM    AST (SGOT) 20 02/18/2021 02:54 PM     Lab Results   Component Value Date/Time    WBC 7.6 02/18/2021 02:54 PM    HGB 12.5 (L) 02/18/2021 02:54 PM    HCT 36.1 02/18/2021 02:54 PM    PLATELET 374 99/61/3279 02:54 PM    MCV 83.2 02/18/2021 02:54 PM     Lab Results   Component Value Date/Time    Hemoglobin A1c 9.8 (H) 08/27/2021 12:25 PM     Lab Results   Component Value Date/Time    Microalbumin/Creat ratio (mg/g creat) 18 08/27/2021 12:25 PM    Microalbumin,urine random 1.83 08/27/2021 12:25 PM     Wt Readings from Last 3 Encounters:   11/30/21 248 lb (112.5 kg)   08/27/21 239 lb 6.4 oz (108.6 kg)   01/14/20 244 lb (110.7 kg)     Last Point of Care HGB A1C  No results found for: [id number]   BP Readings from Last 3 Encounters:   11/30/21 (!) 168/95   08/27/21 (!) 140/86   01/14/20 130/80       Results for orders placed or performed during the hospital encounter of 08/27/21   HEMOGLOBIN A1C WITH EAG   Result Value Ref Range    Hemoglobin A1c 9.8 (H) 4.2 - 5.6 %    Est. average glucose 494 mg/dL   METABOLIC PANEL, BASIC   Result Value Ref Range    Sodium 138 136 - 145 mmol/L    Potassium 4.1 3.5 - 5.5 mmol/L    Chloride 102 100 - 111 mmol/L    CO2 32 21 - 32 mmol/L    Anion gap 4 3.0 - 18 mmol/L    Glucose 191 (H) 74 - 99 mg/dL    BUN 10 7.0 - 18 MG/DL    Creatinine 0.88 0.6 - 1.3 MG/DL    BUN/Creatinine ratio 11 (L) 12 - 20      GFR est AA >60 >60 ml/min/1.73m2    GFR est non-AA >60 >60 ml/min/1.73m2    Calcium 9.4 8.5 - 10.1 MG/DL   LIPID PANEL   Result Value Ref Range    LIPID PROFILE          Cholesterol, total 260 (H) <200 MG/DL    Triglyceride 144 <150 MG/DL    HDL Cholesterol 45 40 - 60 MG/DL    LDL, calculated 186.2 (H) 0 - 100 MG/DL    VLDL, calculated 28.8 MG/DL    CHOL/HDL Ratio 5.8 (H) 0 - 5.0     HEPATITIS C AB   Result Value Ref Range    Hepatitis C virus Ab 0.03 <0.80 Index    Hep C virus Ab Interp. Negative NEG      Hep C  virus Ab comment         MICROALBUMIN, UR, RAND W/ MICROALB/CREAT RATIO   Result Value Ref Range    Microalbumin,urine random 1.83 0 - 3.0 MG/DL    Creatinine, urine 102.00 30 - 125 mg/dL    Microalbumin/Creat ratio (mg/g creat) 18 0 - 30 mg/g         Last Diabetic Foot Exam on:   Diabetic Foot and Eye Exam HM Status   Topic Date Due    Eye Exam  Never done    Diabetic Foot Care  08/27/2022             Objective:  Visit Vitals  BP (!) 168/95 (BP 1 Location: Right upper arm, BP Patient Position: Sitting)   Pulse 71   Temp 97.5 °F (36.4 °C) (Temporal)   Resp 16   Wt 248 lb (112.5 kg)   SpO2 97%   BMI 35.58 kg/m²     Awake and alert in no acute distress   Neck supple without lymphadenopathy, no carotid artery bruits auscultated bilaterally. No thyromegaly   Lungs clear throughout   S1 S2 RRR without ectopy or murmur auscultated. Extremities: no clubbing, cyanosis, peripheral edema   Abdomen - soft, nontender, nondistended, no masses or organomegaly  Integumentary: no rashes   Reviewed vital signs       Assessment/Plan:    ICD-10-CM ICD-9-CM    1. Essential hypertension, benign  I10 401.1    2. Type 2 diabetes mellitus without complication, without long-term current use of insulin (HCC)  E11.9 250.00    3.  Mixed hyperlipidemia  E78.2 272.2       Issues reviewed with him: low cholesterol diet, weight control and daily exercise discussed. I have discussed the diagnosis with the patient and the intended plan as seen in the above orders. The patient has received an after-visit summary and questions were answered concerning future plans. I have discussed medication side effects and warnings with the patient as well. Patient agreeable with above plan and verbalizes understanding.

## 2021-11-30 NOTE — PATIENT INSTRUCTIONS
Low Sodium Diet (2,000 Milligram): Care Instructions  Overview     Limiting sodium can be an important part of managing some health problems. The most common source of sodium is salt. People get most of the salt in their diet from canned, prepared, and packaged foods. Fast food and restaurant meals also are very high in sodium. Your doctor will probably limit your sodium to less than 2,000 milligrams (mg) a day. This limit counts all the sodium in prepared and packaged foods and any salt you add to your food. Follow-up care is a key part of your treatment and safety. Be sure to make and go to all appointments, and call your doctor if you are having problems. It's also a good idea to know your test results and keep a list of the medicines you take. How can you care for yourself at home? Read food labels  · Read labels on cans and food packages. The labels tell you how much sodium is in each serving. Make sure that you look at the serving size. If you eat more than the serving size, you have eaten more sodium. · Food labels also tell you the Percent Daily Value for sodium. Choose products with low Percent Daily Values for sodium. · Be aware that sodium can come in forms other than salt, including monosodium glutamate (MSG), sodium citrate, and sodium bicarbonate (baking soda). MSG is often added to Asian food. When you eat out, you can sometimes ask for food without MSG or added salt. Buy low-sodium foods  · Buy foods that are labeled \"unsalted\" (no salt added), \"sodium-free\" (less than 5 mg of sodium per serving), or \"low-sodium\" (140 mg or less of sodium per serving). Foods labeled \"reduced-sodium\" and \"light sodium\" may still have too much sodium. Be sure to read the label to see how much sodium you are getting. · Buy fresh vegetables, or frozen vegetables without added sauces. Buy low-sodium versions of canned vegetables, soups, and other canned goods.   Prepare low-sodium meals  · Cut back on the amount of salt you use in cooking. This will help you adjust to the taste. Do not add salt after cooking. One teaspoon of salt has about 2,300 mg of sodium. · Take the salt shaker off the table. · Flavor your food with garlic, lemon juice, onion, vinegar, herbs, and spices. Do not use soy sauce, lite soy sauce, steak sauce, onion salt, garlic salt, celery salt, or ketchup on your food. · Use low-sodium salad dressings, sauces, and ketchup. Or make your own salad dressings and sauces without adding salt. · Use less salt (or none) when recipes call for it. You can often use half the salt a recipe calls for without losing flavor. Other foods such as rice, pasta, and grains do not need added salt. · Rinse canned vegetables, and cook them in fresh water. This removes somebut not allof the salt. · Avoid water that is naturally high in sodium or that has been treated with water softeners, which add sodium. If you buy bottled water, read the label and choose a sodium-free brand. Avoid high-sodium foods  · Avoid eating:  ? Smoked, cured, salted, and canned meat, fish, and poultry. ? Ham, pollard, hot dogs, and luncheon meats. ? Regular, hard, and processed cheese and regular peanut butter. ? Crackers with salted tops, and other salted snack foods such as pretzels, chips, and salted popcorn. ? Frozen prepared meals, unless labeled low-sodium. ? Canned and dried soups, broths, and bouillon, unless labeled sodium-free or low-sodium. ? Canned vegetables, unless labeled sodium-free or low-sodium. ? [place] fries, pizza, tacos, and other fast foods. ? Pickles, olives, ketchup, and other condiments, especially soy sauce, unless labeled sodium-free or low-sodium. Where can you learn more? Go to http://www.gray.com/  Enter V843 in the search box to learn more about \"Low Sodium Diet (2,000 Milligram): Care Instructions. \"  Current as of: December 17, 2020               Content Version: 13.0  © 9033-1473 Healthwise, [employer]. Care instructions adapted under license by [employer] (which disclaims liability or warranty for this information). If you have questions about a medical condition or this instruction, always ask your healthcare professional. [address] any warranty or liability for your use of this information. Learning About Low-Sodium Foods  What foods are low in sodium? The foods you eat contain nutrients, such as vitamins and minerals. Sodium is a nutrient. Your body needs the right amount to stay healthy and work as it should. You can use the list below to help you make choices about which foods to eat. Fruits  · Fresh, frozen, canned, or dried fruit  Vegetables  · Fresh or frozen vegetables, with no added salt  · Canned vegetables, low-sodium or with no added salt  Grains  · Bagels without salted tops  · Cereal with no added salt  · Corn tortillas  · Crackers with no added salt  · Oatmeal, cooked without salt  · Popcorn with no salt  · Pasta and noodles, cooked without salt  · Rice, cooked without salt  · Unsalted pretzels  Dairy and dairy alternatives  · Butter, unsalted  · Cream cheese  · Ice cream  · Milk  · Soy milk  Meats and other protein foods  · Beans and peas, canned with no salt  · Eggs  · Fresh fish (not smoked)  · Fresh meats (not smoked or cured)  · Nuts and nut butter, prepared without salt  · Poultry, not packaged with sodium solution  · Tofu, unseasoned  · Tuna, canned without salt  Seasonings  · Garlic  · Herbs and spices  · Lemon juice  · Mustard  · Olive oil  · Salt-free seasoning mixes  · Vinegar  Work with your doctor to find out how much of this nutrient you need. Depending on your health, you may need more or less of it in your diet. Where can you learn more? Go to http://www.gray.com/  Enter S460 in the search box to learn more about \"Learning About Low-Sodium Foods. \"  Current as of: December 17, 2020               Content Version: 13.0  © 2006-2021 [employer]. Care instructions adapted under license by [employer] (which disclaims liability or warranty for this information). If you have questions about a medical condition or this instruction, always ask your healthcare professional. [address] any warranty or liability for your use of this information. How to Read a Food Label to Limit Sodium: Care Instructions  Overview  Limiting sodium can be an important part of managing some health problems. Processed foods, fast food, and restaurant foods are the major sources of dietary sodium. The most common name for sodium is salt. Most packaged foods have a Nutrition Facts label. This will tell you how much sodium is in one serving of food. Follow-up care is a key part of your treatment and safety. Be sure to make and go to all appointments, and call your doctor if you are having problems. It's also a good idea to know your test results and keep a list of the medicines you take. How can you care for yourself at home? Read ingredient lists on food labels  · Read the list of ingredients on food labels to help you find how much sodium is in a food. The label lists the ingredients in a food in descending order (from the most to the least). If salt or sodium is high on the list, there may be a lot of sodium in the food. · Know that sodium has different names. Sodium is also called monosodium glutamate (MSG, common in [facility] food), sodium citrate, sodium alginate, and sodium phosphate. Read Nutrition Facts labels  · On most foods, there is a Nutrition Facts label. This will tell you how much sodium is in one serving of food. Look at both the serving size and the sodium amount. The serving size is located at the top of the label, usually right under the \"Nutrition Facts\" title. The amount of sodium is given in the list under the title.  It is given in milligrams (mg). ? Check the serving size carefully. A single serving is often very small, and you may eat more than one serving. If this is the case, you will eat more sodium than listed on the label. For example, if the serving size for a canned soup is 1 cup and the sodium amount is 470 mg, if you have 2 cups you will eat 940 mg of sodium. · The nutrition facts for fresh fruits and vegetables are not listed on the food. They may be listed somewhere in the store. These foods usually have no sodium or low sodium. · The Nutrition Facts label also gives you the Percent Daily Value for sodium. This is how much of the recommended amount of sodium a serving contains. The daily value for sodium is less than 2,300 mg. So if the Percent Daily Value says 50%, this means one serving is giving you half of this, or 1,150 mg. Buy low-sodium foods  · Look for foods that are made with less sodium. Watch for the following words on the label. ? \"Unsalted\" means there is no sodium added to the food. But there may be sodium already in the food naturally. ? \"Sodium-free\" means a serving has less than 5 mg of sodium. ? \"Very low sodium\" means a serving has 35 mg or less of sodium. ? \"Low-sodium\" means a serving has 140 mg or less of sodium. · \"Reduced-sodium\" means that there is 25% less sodium than what the food normally has. This is still usually too much sodium. Try not to buy foods with this on the label. · Buy fresh vegetables, or frozen vegetables without added sauces. Buy low-sodium versions of canned vegetables, soups, and other canned goods. Where can you learn more? Go to http://www.gray.com/  Enter B757 in the search box to learn more about \"How to Read a Food Label to Limit Sodium: Care Instructions. \"  Current as of: December 17, 2020               Content Version: 13.0  © 3636-6371 Healthwise, [facility].    Care instructions adapted under license by [employer] (which disclaims liability or warranty for this information). If you have questions about a medical condition or this instruction, always ask your healthcare professional. [address] any warranty or liability for your use of this information.

## 2021-12-01 ENCOUNTER — HOSPITAL ENCOUNTER (OUTPATIENT)
Dept: LAB | Age: 51
Discharge: HOME | End: 2021-12-01
Payer: OTHER GOVERNMENT

## 2021-12-01 DIAGNOSIS — E78.2 MIXED HYPERLIPIDEMIA: Primary | ICD-10-CM

## 2021-12-01 DIAGNOSIS — E11.9 TYPE 2 DIABETES MELLITUS WITHOUT COMPLICATION, WITHOUT LONG-TERM CURRENT USE OF INSULIN (HCC): ICD-10-CM

## 2021-12-01 DIAGNOSIS — I10 ESSENTIAL HYPERTENSION, BENIGN: ICD-10-CM

## 2021-12-01 LAB
ANION GAP SERPL CALC-SCNC: 6 MMOL/L (ref 3–18)
BUN SERPL-MCNC: 14 MG/DL (ref 7–18)
BUN/CREAT SERPL: 14 (ref 12–20)
CALCIUM SERPL-MCNC: 10 MG/DL (ref 8.5–10.1)
CHLORIDE SERPL-SCNC: 104 MMOL/L (ref 100–111)
CHOLEST SERPL-MCNC: 172 MG/DL
CO2 SERPL-SCNC: 30 MMOL/L (ref 21–32)
CREAT SERPL-MCNC: 0.98 MG/DL (ref 0.6–1.3)
CREAT UR-MCNC: 30 MG/DL (ref 30–125)
EST. AVERAGE GLUCOSE BLD GHB EST-MCNC: 166 MG/DL
GLUCOSE SERPL-MCNC: 145 MG/DL (ref 74–99)
HBA1C MFR BLD: 7.4 % (ref 4.2–5.6)
HDLC SERPL-MCNC: 43 MG/DL (ref 40–60)
HDLC SERPL: 4 {RATIO} (ref 0–5)
LDLC SERPL CALC-MCNC: 111 MG/DL (ref 0–100)
LIPID PROFILE,FLP: ABNORMAL
MICROALBUMIN UR-MCNC: 0.55 MG/DL (ref 0–3)
MICROALBUMIN/CREAT UR-RTO: 18 MG/G (ref 0–30)
POTASSIUM SERPL-SCNC: 4.4 MMOL/L (ref 3.5–5.5)
SODIUM SERPL-SCNC: 140 MMOL/L (ref 136–145)
TRIGL SERPL-MCNC: 90 MG/DL (ref ?–150)
VLDLC SERPL CALC-MCNC: 18 MG/DL

## 2021-12-01 PROCEDURE — 82043 UR ALBUMIN QUANTITATIVE: CPT

## 2021-12-01 PROCEDURE — 80061 LIPID PANEL: CPT

## 2021-12-01 PROCEDURE — 80048 BASIC METABOLIC PNL TOTAL CA: CPT

## 2021-12-01 PROCEDURE — 83036 HEMOGLOBIN GLYCOSYLATED A1C: CPT
